# Patient Record
Sex: FEMALE | Race: WHITE | Employment: FULL TIME | URBAN - METROPOLITAN AREA
[De-identification: names, ages, dates, MRNs, and addresses within clinical notes are randomized per-mention and may not be internally consistent; named-entity substitution may affect disease eponyms.]

---

## 2017-03-22 ENCOUNTER — OFFICE VISIT (OUTPATIENT)
Dept: CARDIOLOGY CLINIC | Age: 57
End: 2017-03-22

## 2017-03-22 VITALS
HEIGHT: 66 IN | HEART RATE: 60 BPM | BODY MASS INDEX: 29.28 KG/M2 | SYSTOLIC BLOOD PRESSURE: 134 MMHG | DIASTOLIC BLOOD PRESSURE: 84 MMHG | WEIGHT: 182.2 LBS | OXYGEN SATURATION: 96 %

## 2017-03-22 DIAGNOSIS — I25.10 CORONARY ARTERY DISEASE INVOLVING NATIVE HEART WITHOUT ANGINA PECTORIS, UNSPECIFIED VESSEL OR LESION TYPE: Primary | ICD-10-CM

## 2017-03-22 DIAGNOSIS — D69.41 EVAN'S SYNDROME (HCC): ICD-10-CM

## 2017-03-22 PROCEDURE — 99213 OFFICE O/P EST LOW 20 MIN: CPT | Performed by: INTERNAL MEDICINE

## 2017-08-15 PROBLEM — I20.0 UNSTABLE ANGINA (HCC): Status: ACTIVE | Noted: 2017-08-15

## 2017-08-18 ENCOUNTER — TELEPHONE (OUTPATIENT)
Dept: CARDIOLOGY CLINIC | Age: 57
End: 2017-08-18

## 2017-08-23 ENCOUNTER — OFFICE VISIT (OUTPATIENT)
Dept: CARDIOLOGY CLINIC | Age: 57
End: 2017-08-23

## 2017-08-23 VITALS
OXYGEN SATURATION: 98 % | HEART RATE: 60 BPM | HEIGHT: 66 IN | BODY MASS INDEX: 28.61 KG/M2 | DIASTOLIC BLOOD PRESSURE: 72 MMHG | WEIGHT: 178 LBS | SYSTOLIC BLOOD PRESSURE: 142 MMHG

## 2017-08-23 DIAGNOSIS — I25.708 CORONARY ARTERY DISEASE OF BYPASS GRAFT OF NATIVE HEART WITH STABLE ANGINA PECTORIS (HCC): Primary | ICD-10-CM

## 2017-08-23 DIAGNOSIS — E78.2 MIXED HYPERLIPIDEMIA: ICD-10-CM

## 2017-08-23 PROCEDURE — 99213 OFFICE O/P EST LOW 20 MIN: CPT | Performed by: NURSE PRACTITIONER

## 2017-08-23 RX ORDER — AMLODIPINE BESYLATE 5 MG/1
5 TABLET ORAL DAILY
Qty: 30 TABLET | Refills: 3 | Status: SHIPPED | OUTPATIENT
Start: 2017-08-23 | End: 2017-12-06 | Stop reason: SDUPTHER

## 2017-08-25 ENCOUNTER — TELEPHONE (OUTPATIENT)
Dept: CARDIOLOGY CLINIC | Age: 57
End: 2017-08-25

## 2017-09-29 ENCOUNTER — OFFICE VISIT (OUTPATIENT)
Dept: CARDIOLOGY CLINIC | Age: 57
End: 2017-09-29

## 2017-09-29 VITALS
DIASTOLIC BLOOD PRESSURE: 72 MMHG | WEIGHT: 182.9 LBS | HEIGHT: 66 IN | BODY MASS INDEX: 29.39 KG/M2 | HEART RATE: 60 BPM | SYSTOLIC BLOOD PRESSURE: 112 MMHG

## 2017-09-29 DIAGNOSIS — E78.2 MIXED HYPERLIPIDEMIA: ICD-10-CM

## 2017-09-29 DIAGNOSIS — I25.708 CORONARY ARTERY DISEASE OF BYPASS GRAFT OF NATIVE HEART WITH STABLE ANGINA PECTORIS (HCC): Primary | ICD-10-CM

## 2017-09-29 PROCEDURE — 99213 OFFICE O/P EST LOW 20 MIN: CPT | Performed by: NURSE PRACTITIONER

## 2017-09-29 RX ORDER — RANOLAZINE 500 MG/1
500 TABLET, EXTENDED RELEASE ORAL 2 TIMES DAILY
Qty: 60 TABLET | Refills: 3 | Status: SHIPPED | OUTPATIENT
Start: 2017-09-29 | End: 2018-01-30 | Stop reason: SDUPTHER

## 2017-11-22 ENCOUNTER — OFFICE VISIT (OUTPATIENT)
Dept: CARDIOLOGY CLINIC | Age: 57
End: 2017-11-22

## 2017-11-22 VITALS
WEIGHT: 185 LBS | DIASTOLIC BLOOD PRESSURE: 80 MMHG | SYSTOLIC BLOOD PRESSURE: 142 MMHG | BODY MASS INDEX: 29.73 KG/M2 | HEIGHT: 66 IN | OXYGEN SATURATION: 97 % | HEART RATE: 61 BPM

## 2017-11-22 DIAGNOSIS — E78.2 MIXED HYPERLIPIDEMIA: ICD-10-CM

## 2017-11-22 DIAGNOSIS — I25.708 CORONARY ARTERY DISEASE OF BYPASS GRAFT OF NATIVE HEART WITH STABLE ANGINA PECTORIS (HCC): Primary | ICD-10-CM

## 2017-11-22 PROCEDURE — G8484 FLU IMMUNIZE NO ADMIN: HCPCS | Performed by: NURSE PRACTITIONER

## 2017-11-22 PROCEDURE — G8427 DOCREV CUR MEDS BY ELIG CLIN: HCPCS | Performed by: NURSE PRACTITIONER

## 2017-11-22 PROCEDURE — G8419 CALC BMI OUT NRM PARAM NOF/U: HCPCS | Performed by: NURSE PRACTITIONER

## 2017-11-22 PROCEDURE — 99213 OFFICE O/P EST LOW 20 MIN: CPT | Performed by: NURSE PRACTITIONER

## 2017-11-22 PROCEDURE — G8598 ASA/ANTIPLAT THER USED: HCPCS | Performed by: NURSE PRACTITIONER

## 2017-11-22 PROCEDURE — 1036F TOBACCO NON-USER: CPT | Performed by: NURSE PRACTITIONER

## 2017-11-22 PROCEDURE — 3017F COLORECTAL CA SCREEN DOC REV: CPT | Performed by: NURSE PRACTITIONER

## 2017-11-22 PROCEDURE — 3014F SCREEN MAMMO DOC REV: CPT | Performed by: NURSE PRACTITIONER

## 2017-11-22 RX ORDER — ROSUVASTATIN CALCIUM 40 MG/1
40 TABLET, COATED ORAL EVERY EVENING
Qty: 30 TABLET | Refills: 3 | Status: SHIPPED | OUTPATIENT
Start: 2017-11-22 | End: 2018-04-27 | Stop reason: SDUPTHER

## 2017-11-22 NOTE — PROGRESS NOTES
[address]     Outpatient Follow Up Note    Tia Caballero is 61 y.o. female who presents today with a history of CAD s/p CABG Jan '14, occl SVG > OM by cath and hyperlipidemia. CHIEF COMPLAINT / HPI:  Follow Up secondary to coronary artery disease: starting Ranexa    Subjective:   She's been feeling good. When she wakes, she has a little twinge in her left upper chest and knows its time to take her medications. It lasts about an hour  Her angina equivalent was diaphoresis, SOB climbing steps, a little nausea and shoulder numbness. She hasn't had a reoccurence. There is no SOB/WASHBURN. The patient denies orthopnea/PND. The patient does not have swelling. The patients weight is stable . The patient is not experiencing palpitations or dizziness. These symptoms are improving since the last OV. With regard to medication therapy the patient has been compliant with prescribed regimen. They have tolerated therapy to date. 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    Hyperlipidemia     Hypertension      Social History:    History   Smoking Status    Former Smoker    Packs/day: 2.00    Years: 20.00    Start date: 1/1/1977   [facility] Quit date: 2/1/1996   Smokeless Tobacco    Never Used     Current Medications:  Current Outpatient Prescriptions   Medication Sig Dispense Refill    metoprolol tartrate (LOPRESSOR) 25 MG tablet TAKE 1 TABLET BY MOUTH TWO TIMES A DAY  60 tablet 4    ranolazine (RANEXA) 500 MG extended release tablet Take 1 tablet by mouth 2 times daily 60 tablet 3    amLODIPine (NORVASC) 5 MG tablet Take 1 tablet by mouth daily 30 tablet 3    aspirin 325 MG EC tablet Take 1 tablet by mouth daily 30 tablet 3    nitroGLYCERIN (NITROSTAT) 0.4 MG SL tablet Place 1 tablet under the tongue every 5 minutes as needed for Chest pain up to max of 3 total doses.  If no relief after 1 dose, call 408. 51 tablet 3    folic acid (FOLVITE) 1 MG tablet Take 1 mg by mouth daily      lisinopril (PRINIVIL;ZESTRIL) 10 MG tablet Take 10 mg by mouth daily.  atorvastatin (LIPITOR) 40 MG tablet TAKE 1 TABLET BY MOUTH AT BEDTIME  30 tablet 4     No current facility-administered medications for this visit. REVIEW OF SYSTEMS:    CONSTITUTIONAL: No major weight gain or loss, fatigue, weakness, night sweats or fever. HEENT: No new vision difficulties or ringing in the ears. RESPIRATORY: No new SOB, PND, orthopnea or cough. CARDIOVASCULAR: See HPI  GI: No nausea, vomiting, diarrhea, constipation, abdominal pain or changes in bowel habits. : No urinary frequency, urgency, incontinence hematuria or dysuria. SKIN: No cyanosis or skin lesions. MUSCULOSKELETAL: No new muscle or joint pain. NEUROLOGICAL: No syncope or TIA-like symptoms. PSYCHIATRIC: No anxiety, pain, insomnia or depression    Objective:   PHYSICAL EXAM:        Vitals:    11/22/17 1148 11/22/17 1216   BP: 132/84 (!) 142/80   Site: Left Arm    Position: Sitting    Cuff Size: Medium Adult    Pulse: 61    SpO2: 97%    Weight: 185 lb (83.9 kg)    Height: 5' 6\" (1.676 m)        VITALS:  /84 (Site: Left Arm, Position: Sitting, Cuff Size: Medium Adult)   Pulse 61   Ht 5' 6\" (1.676 m)   Wt 185 lb (83.9 kg)   SpO2 97%   BMI 29.86 kg/m²   CONSTITUTIONAL: Cooperative, no apparent distress, and appears well nourished / developed  NEUROLOGIC:  Awake and orientated to person, place and time. PSYCH: Calm affect. SKIN: Warm and dry. HEENT: Sclera non-icteric, normocephalic, neck supple, no elevation of JVP, normal carotid pulses with no bruits and thyroid normal size. LUNGS:  No increased work of breathing and clear to auscultation, no crackles or wheezing  CARDIOVASCULAR:  Regular rate 64 and rhythm with no murmurs, gallops, rubs, or abnormal heart sounds, normal PMI. The apical impulses not displaced  JVP less than 8 cm H2O  Heart tones are crisp and

## 2017-11-22 NOTE — COMMUNICATION BODY
[address]     Outpatient Follow Up Note    Eli Ruvalcaba is 61 y.o. female who presents today with a history of CAD s/p CABG Jan '14, occl SVG > OM by cath and hyperlipidemia. CHIEF COMPLAINT / HPI:  Follow Up secondary to coronary artery disease: starting Ranexa    Subjective:   She's been feeling good. When she wakes, she has a little twinge in her left upper chest and knows its time to take her medications. It lasts about an hour  Her angina equivalent was diaphoresis, SOB climbing steps, a little nausea and shoulder numbness. She hasn't had a reoccurence. There is no SOB/WASHBURN. The patient denies orthopnea/PND. The patient does not have swelling. The patients weight is stable . The patient is not experiencing palpitations or dizziness. These symptoms are improving since the last OV. With regard to medication therapy the patient has been compliant with prescribed regimen. They have tolerated therapy to date. Current Outpatient Prescriptions   Medication Sig Dispense Refill    metoprolol tartrate (LOPRESSOR) 25 MG tablet TAKE 1 TABLET BY MOUTH TWO TIMES A DAY  60 tablet 4    ranolazine (RANEXA) 500 MG extended release tablet Take 1 tablet by mouth 2 times daily 60 tablet 3    amLODIPine (NORVASC) 5 MG tablet Take 1 tablet by mouth daily 30 tablet 3    aspirin 325 MG EC tablet Take 1 tablet by mouth daily 30 tablet 3    nitroGLYCERIN (NITROSTAT) 0.4 MG SL tablet Place 1 tablet under the tongue every 5 minutes as needed for Chest pain up to max of 3 total doses. If no relief after 1 dose, call 911. 25 tablet 3    folic acid (FOLVITE) 1 MG tablet Take 1 mg by mouth daily      lisinopril (PRINIVIL;ZESTRIL) 10 MG tablet Take 10 mg by mouth daily.       atorvastatin (LIPITOR) 40 MG tablet TAKE 1 TABLET BY MOUTH AT BEDTIME  30 tablet 4         Objective:   PHYSICAL EXAM:        Vitals:    11/22/17 1148 11/22/17 1216   BP: 132/84 (!) 142/80   Site: Left Arm    Position: Sitting    Cuff current supply of atorvastatin is finished, she will change to creator 40 mg daily   ~recheck profile/LFTs 8 weeks after med change  2. F/U in 4 months     Overall the patient is stable from CV standpoint    I have addresed the patient's cardiac risk factors and adjusted pharmacologic treatment as needed. In addition, I have reinforced the need for patient directed risk factor modification. Further evaluation will be based upon the patient's clinical course and testing results. All questions and concerns were addressed to the patient. Alternatives to my treatment were discussed. The patient is not currently smoking. The risks related to smoking were reviewed with the patient. Recommend maintaining a smoke-free lifestyle. Patient is on a beta-blocker  Patient is on an ace-i  Patient is on a statin    Antiplatelet therapy has been recommended / prescribed for this patient. Education conducted on adverse reactions including bleeding was discussed. The patient verbalizes understanding not to stop medications without discussing with us. Discussed exercise: 30-60 minutes 7 days/week  Discussed Low saturated fat diet. Thank you for allowing to us to participate in the care of Shelby Oakley.

## 2017-11-22 NOTE — LETTER
 folic acid (FOLVITE) 1 MG tablet Take 1 mg by mouth daily      lisinopril (PRINIVIL;ZESTRIL) 10 MG tablet Take 10 mg by mouth daily.  atorvastatin (LIPITOR) 40 MG tablet TAKE 1 TABLET BY MOUTH AT BEDTIME  30 tablet 4         Objective:   PHYSICAL EXAM:        Vitals:    11/22/17 1148 11/22/17 1216   BP: 132/84 (!) 142/80   Site: Left Arm    Position: Sitting    Cuff Size: Medium Adult    Pulse: 61    SpO2: 97%    Weight: 185 lb (83.9 kg)    Height: 5' 6\" (1.676 m)        VITALS:  /84 (Site: Left Arm, Position: Sitting, Cuff Size: Medium Adult)   Pulse 61   Ht 5' 6\" (1.676 m)   Wt 185 lb (83.9 kg)   SpO2 97%   BMI 29.86 kg/m²    CONSTITUTIONAL: Cooperative, no apparent distress, and appears well nourished / developed  NEUROLOGIC:  Awake and orientated to person, place and time. PSYCH: Calm affect. SKIN: Warm and dry. HEENT: Sclera non-icteric, normocephalic, neck supple, no elevation of JVP, normal carotid pulses with no bruits and thyroid normal size. LUNGS:  No increased work of breathing and clear to auscultation, no crackles or wheezing  CARDIOVASCULAR:  Regular rate 64 and rhythm with no murmurs, gallops, rubs, or abnormal heart sounds, normal PMI. The apical impulses not displaced  JVP less than 8 cm H2O  Heart tones are crisp and normal  Cervical veins are not engorged  The carotid upstroke is normal in amplitude and contour without delay or bruit  JVP is not elevated  ABDOMEN:  Normal bowel sounds, non-distended and non-tender to palpation  EXT: No edema, no calf tenderness. Pulses are present bilaterally. DATA:    Lab Results   Component Value Date    TRIG 165 (H) 08/16/2017     Lab Results   Component Value Date    HDL 53 08/16/2017     Lab Results   Component Value Date    LDLCALC 56 08/16/2017     Lab Results   Component Value Date    LABVLDL 33 08/16/2017       Assessment:     1.  Coronary artery disease of bypass graft of native heart with stable angina pectoris ([facility] [address] ~improved and overall feeling good on Ranexa at 500 mg bid  ~occl SVG > OM ; mild competitive flow LAD  ~intolerant to Imdur with HAs   ~ASA / statin / BB    2. Mixed hyperlipidemia   ~unchanged / stable   ~trig near goal otherwise well controlled   ~atorvastatin           I had the opportunity to review the clinical symptoms and presentation of Pearl Loyola. Plan:     1. Once current supply of atorvastatin is finished, she will change to creator 40 mg daily   ~recheck profile/LFTs 8 weeks after med change  2. F/U in 4 months     Overall the patient is stable from CV standpoint    I have addresed the patient's cardiac risk factors and adjusted pharmacologic treatment as needed. In addition, I have reinforced the need for patient directed risk factor modification. Further evaluation will be based upon the patient's clinical course and testing results. All questions and concerns were addressed to the patient. Alternatives to my treatment were discussed. The patient is not currently smoking. The risks related to smoking were reviewed with the patient. Recommend maintaining a smoke-free lifestyle. Patient is on a beta-blocker  Patient is on an ace-i  Patient is on a statin    Antiplatelet therapy has been recommended / prescribed for this patient. Education conducted on adverse reactions including bleeding was discussed. The patient verbalizes understanding not to stop medications without discussing with us. Discussed exercise: 30-60 minutes 7 days/week  Discussed Low saturated fat diet. Thank you for allowing to us to participate in the care of Pearl Loyola. If you have questions, please do not hesitate to call me. I look forward to following Piter Reid along with you.     Sincerely,        Awa Olmstead NP

## 2017-12-06 RX ORDER — AMLODIPINE BESYLATE 5 MG/1
TABLET ORAL
Qty: 30 TABLET | Refills: 2 | Status: SHIPPED | OUTPATIENT
Start: 2017-12-06 | End: 2017-12-07 | Stop reason: SDUPTHER

## 2017-12-07 RX ORDER — AMLODIPINE BESYLATE 5 MG/1
TABLET ORAL
Qty: 30 TABLET | Refills: 5 | Status: SHIPPED | OUTPATIENT
Start: 2017-12-07 | End: 2018-04-03 | Stop reason: DRUGHIGH

## 2018-01-31 ENCOUNTER — TELEPHONE (OUTPATIENT)
Dept: CARDIOLOGY CLINIC | Age: 58
End: 2018-01-31

## 2018-01-31 RX ORDER — RANOLAZINE 500 MG/1
TABLET, FILM COATED, EXTENDED RELEASE ORAL
Qty: 60 TABLET | Refills: 2 | Status: SHIPPED | OUTPATIENT
Start: 2018-01-31 | End: 2018-03-23

## 2018-01-31 NOTE — TELEPHONE ENCOUNTER
Pt states that Ranexa is too costly , she said that she took something previously helped but could not remember the name, and could not give to me because she wasn't at home. I asked if it was the Imdur and she stated that she didn't think that was the name of it. I told her I didn't see any other med the would fall along those lines. I told her that I could send a message to [facility], but not here till tomorrow. She said she was ok with that, and that she has already been out for 3 days.

## 2018-02-02 NOTE — TELEPHONE ENCOUNTER
Can you see if she's eligible for assistance through the company.  She can't take Imdur and is already on amlodipine

## 2018-02-05 NOTE — TELEPHONE ENCOUNTER
Spoke with pt- referred pt.to [facility] [phone]. On line sts that they will cover what ins does not pay. She will call them now. Advised to let them know she can't take Imdur and she is already on amlodipine.

## 2018-03-21 NOTE — PROGRESS NOTES
[address]       H+P // CONSULT // OUTPATIENT VISIT // [facility]     Referring Doctor Reese Burn [] Acute [x] Chronic     Symptom [x] None [] CP [] SOB [] Dizzy [] Palps [] Fatigue     Problems CAD s/p CABG, HTN, CHOL      HISTORY OF PRESENT ILLNESS     Doing well. Denies cp, sob. Compliant with meds. Rare cp. Not taking ranexa due to cost.       Symptom Y N Frequency Duration Severity Modifying Assoc Sx Other   CP [x] [] rare secs mild none none    SOB [] [x]         Dizzy [] [x]         Syncope [] [x]         Palpitations [] [x]           COMPLIANCE     Category Meds Diet Salt Exercise Tobacco Alcohol Drugs   Compliant [x] [x] [x] [x] [x] [x] [x]   [x]Counseling given on all above above categories    HISTORY/ALLERGIES/ROS     MedHx:  has a past medical history of CAD (coronary artery disease); Disease of blood and blood forming organ; Hyperlipidemia; and Hypertension. SurgHx:  has a past surgical history that includes Appendectomy; Knee arthroscopy (Left, ); Carpal tunnel release (Bilateral, );  section; Tonsillectomy; Coronary artery bypass graft (14 emergent); Splenectomy (12/15); Cardiac surgery; and Coronary angioplasty (2017). SocHx:   reports that she quit smoking about 22 years ago. She started smoking about 41 years ago. She has a 40.00 pack-year smoking history. She has never used smokeless tobacco. She reports that she drinks alcohol. She reports that she does not use drugs. FamHx: family history includes High Blood Pressure in her mother. Allergies: Eggs or egg-derived products [eggs or egg-derived products];  Imdur [isosorbide dinitrate]; and Poultry meal   ROS:  [x]Full ROS obtained and negative except as mentioned in HPI     MEDICATIONS      Current Outpatient Prescriptions   Medication Sig Dispense Refill    metoprolol tartrate (LOPRESSOR) 25 MG tablet TAKE 1 TABLET BY MOUTH TWO TIMES A DAY  60 tablet 5    metoprolol tartrate (LOPRESSOR) 25 MG tablet TAKE 1 TABLET BY MOUTH TWO TIMES A DAY  60 tablet 4    RANEXA 500 MG extended release tablet TAKE 1 TABLET BY MOUTH TWO TIMES A DAY  60 tablet 2    amLODIPine (NORVASC) 5 MG tablet TAKE 1 TABLET BY MOUTH ONE TIME A DAY 30 tablet 5    rosuvastatin (CRESTOR) 40 MG tablet Take 1 tablet by mouth every evening 30 tablet 3    aspirin 325 MG EC tablet Take 1 tablet by mouth daily 30 tablet 3    nitroGLYCERIN (NITROSTAT) 0.4 MG SL tablet Place 1 tablet under the tongue every 5 minutes as needed for Chest pain up to max of 3 total doses. If no relief after 1 dose, call 911. 25 tablet 3    folic acid (FOLVITE) 1 MG tablet Take 1 mg by mouth daily      lisinopril (PRINIVIL;ZESTRIL) 10 MG tablet Take 10 mg by mouth daily.  atorvastatin (LIPITOR) 40 MG tablet TAKE 1 TABLET BY MOUTH AT BEDTIME  30 tablet 4     No current facility-administered medications for this visit.       Reviewed with patient and will remain unchanged except as mentioned in A/P  PHYSICAL EXAM     Vitals:    03/23/18 0745   BP: 112/70   Pulse: 59   SpO2: 95%      Gen Alert, coop, no distress Heart  RRR, no MRG, nl apic impulse   Head NC, AT, no abnorm Abd  Soft, NT, +BS, no mass, no OM   Eyes PERRLA, conj/corn clear Ext  Ext nl, AT, no C/C/E   Nose Nares nl, no drain, NT Pulse 2+ and symmetric   Throat Lips, mucosa, tongue nl Skin Color/text/turg nl, no rash/lesions   Neck S/S, TM, NT, no bruit/JVD Psych Nl mood and affect   Lung CTA-B, unlabored, no DTP Lymph   No cervical or axillary LA   Ch wall NT, no deform Neuro  Nl gross M/S exam     ASSESSMENT AND PLAN   ~CAD   Date EF Results   Sx   No concerning   [facility]   [x]I         []II           []III          []IV   Hx 1/14  CABG   [facility] 1/14 8/17   55% MVD  SVG-OM occluded - medical management   MPI 10/15 61% Lateral ischemia/scar   TTE   None   Plan   Continue aggressive medical treatment at doses above   ~HTN  Today BP [x] Controlled

## 2018-03-21 NOTE — TELEPHONE ENCOUNTER
Last ov 3/22/17  Pending appt 3/23/18  Last refill 3/7/18 #60x4, transmission did not go through [facility] meds

## 2018-03-23 ENCOUNTER — OFFICE VISIT (OUTPATIENT)
Dept: CARDIOLOGY CLINIC | Age: 58
End: 2018-03-23

## 2018-03-23 VITALS
HEIGHT: 66 IN | BODY MASS INDEX: 30.36 KG/M2 | SYSTOLIC BLOOD PRESSURE: 112 MMHG | DIASTOLIC BLOOD PRESSURE: 70 MMHG | OXYGEN SATURATION: 95 % | HEART RATE: 59 BPM | WEIGHT: 188.9 LBS

## 2018-03-23 DIAGNOSIS — E78.00 HYPERCHOLESTEREMIA: ICD-10-CM

## 2018-03-23 DIAGNOSIS — I10 ESSENTIAL HYPERTENSION: ICD-10-CM

## 2018-03-23 DIAGNOSIS — I25.10 CORONARY ARTERY DISEASE INVOLVING NATIVE CORONARY ARTERY OF NATIVE HEART WITHOUT ANGINA PECTORIS: Primary | ICD-10-CM

## 2018-03-23 PROCEDURE — G8598 ASA/ANTIPLAT THER USED: HCPCS | Performed by: INTERNAL MEDICINE

## 2018-03-23 PROCEDURE — G8484 FLU IMMUNIZE NO ADMIN: HCPCS | Performed by: INTERNAL MEDICINE

## 2018-03-23 PROCEDURE — 1036F TOBACCO NON-USER: CPT | Performed by: INTERNAL MEDICINE

## 2018-03-23 PROCEDURE — G8427 DOCREV CUR MEDS BY ELIG CLIN: HCPCS | Performed by: INTERNAL MEDICINE

## 2018-03-23 PROCEDURE — G8417 CALC BMI ABV UP PARAM F/U: HCPCS | Performed by: INTERNAL MEDICINE

## 2018-03-23 PROCEDURE — 99214 OFFICE O/P EST MOD 30 MIN: CPT | Performed by: INTERNAL MEDICINE

## 2018-03-23 PROCEDURE — 3017F COLORECTAL CA SCREEN DOC REV: CPT | Performed by: INTERNAL MEDICINE

## 2018-03-23 PROCEDURE — 3014F SCREEN MAMMO DOC REV: CPT | Performed by: INTERNAL MEDICINE

## 2018-03-23 RX ORDER — ASPIRIN 81 MG/1
81 TABLET ORAL DAILY
Qty: 30 TABLET | Refills: 0 | Status: SHIPPED | OUTPATIENT
Start: 2018-03-23 | End: 2018-04-19 | Stop reason: SDUPTHER

## 2018-03-23 NOTE — COMMUNICATION BODY
A DAY  60 tablet 5    metoprolol tartrate (LOPRESSOR) 25 MG tablet TAKE 1 TABLET BY MOUTH TWO TIMES A DAY  60 tablet 4    RANEXA 500 MG extended release tablet TAKE 1 TABLET BY MOUTH TWO TIMES A DAY  60 tablet 2    amLODIPine (NORVASC) 5 MG tablet TAKE 1 TABLET BY MOUTH ONE TIME A DAY 30 tablet 5    rosuvastatin (CRESTOR) 40 MG tablet Take 1 tablet by mouth every evening 30 tablet 3    aspirin 325 MG EC tablet Take 1 tablet by mouth daily 30 tablet 3    nitroGLYCERIN (NITROSTAT) 0.4 MG SL tablet Place 1 tablet under the tongue every 5 minutes as needed for Chest pain up to max of 3 total doses. If no relief after 1 dose, call 911. 25 tablet 3    folic acid (FOLVITE) 1 MG tablet Take 1 mg by mouth daily      lisinopril (PRINIVIL;ZESTRIL) 10 MG tablet Take 10 mg by mouth daily.  atorvastatin (LIPITOR) 40 MG tablet TAKE 1 TABLET BY MOUTH AT BEDTIME  30 tablet 4     No current facility-administered medications for this visit.       Reviewed with patient and will remain unchanged except as mentioned in A/P  PHYSICAL EXAM     Vitals:    03/23/18 0745   BP: 112/70   Pulse: 59   SpO2: 95%      Gen Alert, coop, no distress Heart  RRR, no MRG, nl apic impulse   Head NC, AT, no abnorm Abd  Soft, NT, +BS, no mass, no OM   Eyes PERRLA, conj/corn clear Ext  Ext nl, AT, no C/C/E   Nose Nares nl, no drain, NT Pulse 2+ and symmetric   Throat Lips, mucosa, tongue nl Skin Color/text/turg nl, no rash/lesions   Neck S/S, TM, NT, no bruit/JVD Psych Nl mood and affect   Lung CTA-B, unlabored, no DTP Lymph   No cervical or axillary LA   Ch wall NT, no deform Neuro  Nl gross M/S exam     ASSESSMENT AND PLAN   ~CAD   Date EF Results   Sx   No concerning   [facility]   [x]I         []II           []III          []IV   Hx 1/14  CABG   [facility] 1/14 8/17   55% MVD  SVG-OM occluded - medical management   MPI 10/15 61% Lateral ischemia/scar   TTE   None   Plan   Continue aggressive medical treatment at doses above   ~HTN  Today BP [x] Controlled [] Borderline [] Uncontrolled   Counseling [x] Diet/Salt [x] Exercise [x] Weight    Plan Continue current medications at doses detailed above  ~Hyperchol  Goal LDL [] <100 [x] <70     Counseling [x] Diet [x] Weight [x] Exercise   Lipid/liver Monitor [x] PCP [] Cardio [] Endocrine   Plan Continue current doses of meds listed above  ~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 Cristóbal Cummings, Hematologist  ~Followup  []2 wk   []1m   []3m    [x]6m   []12m    []PRN  []Other:

## 2018-03-30 ENCOUNTER — HOSPITAL ENCOUNTER (OUTPATIENT)
Dept: VASCULAR LAB | Age: 58
Discharge: HOME | End: 2018-03-30
Attending: EMERGENCY MEDICINE | Admitting: EMERGENCY MEDICINE

## 2018-03-30 DIAGNOSIS — R60.0 LOCALIZED EDEMA: ICD-10-CM

## 2018-03-30 DIAGNOSIS — R60.0 BILATERAL LOWER EXTREMITY EDEMA: ICD-10-CM

## 2018-04-03 ENCOUNTER — OFFICE VISIT (OUTPATIENT)
Dept: CARDIOLOGY CLINIC | Age: 58
End: 2018-04-03

## 2018-04-03 VITALS
DIASTOLIC BLOOD PRESSURE: 72 MMHG | OXYGEN SATURATION: 96 % | BODY MASS INDEX: 30.53 KG/M2 | HEART RATE: 68 BPM | HEIGHT: 66 IN | SYSTOLIC BLOOD PRESSURE: 128 MMHG | WEIGHT: 190 LBS

## 2018-04-03 DIAGNOSIS — I10 ESSENTIAL HYPERTENSION: ICD-10-CM

## 2018-04-03 DIAGNOSIS — E78.2 MIXED HYPERLIPIDEMIA: ICD-10-CM

## 2018-04-03 DIAGNOSIS — R60.0 LOCALIZED EDEMA: Primary | ICD-10-CM

## 2018-04-03 DIAGNOSIS — I25.10 CORONARY ARTERY DISEASE INVOLVING NATIVE CORONARY ARTERY OF NATIVE HEART WITHOUT ANGINA PECTORIS: ICD-10-CM

## 2018-04-03 PROCEDURE — G8598 ASA/ANTIPLAT THER USED: HCPCS | Performed by: NURSE PRACTITIONER

## 2018-04-03 PROCEDURE — G8417 CALC BMI ABV UP PARAM F/U: HCPCS | Performed by: NURSE PRACTITIONER

## 2018-04-03 PROCEDURE — 1036F TOBACCO NON-USER: CPT | Performed by: NURSE PRACTITIONER

## 2018-04-03 PROCEDURE — G8427 DOCREV CUR MEDS BY ELIG CLIN: HCPCS | Performed by: NURSE PRACTITIONER

## 2018-04-03 PROCEDURE — 99214 OFFICE O/P EST MOD 30 MIN: CPT | Performed by: NURSE PRACTITIONER

## 2018-04-03 PROCEDURE — 3017F COLORECTAL CA SCREEN DOC REV: CPT | Performed by: NURSE PRACTITIONER

## 2018-04-03 PROCEDURE — 3014F SCREEN MAMMO DOC REV: CPT | Performed by: NURSE PRACTITIONER

## 2018-04-03 RX ORDER — AMLODIPINE BESYLATE 5 MG/1
TABLET ORAL
Qty: 30 TABLET | Refills: 5 | Status: SHIPPED
Start: 2018-04-03 | End: 2018-05-03

## 2018-04-03 RX ORDER — LISINOPRIL 20 MG/1
20 TABLET ORAL DAILY
Qty: 90 TABLET | Refills: 3 | Status: SHIPPED | OUTPATIENT
Start: 2018-04-03 | End: 2018-05-03 | Stop reason: SDUPTHER

## 2018-04-19 RX ORDER — ASPIRIN 81 MG/1
TABLET, DELAYED RELEASE ORAL
Qty: 30 TABLET | Refills: 0 | Status: SHIPPED | OUTPATIENT
Start: 2018-04-19 | End: 2019-12-09

## 2018-04-27 RX ORDER — ROSUVASTATIN CALCIUM 40 MG/1
TABLET, COATED ORAL
Qty: 30 TABLET | Refills: 1 | Status: SHIPPED | OUTPATIENT
Start: 2018-04-27 | End: 2018-06-30 | Stop reason: SDUPTHER

## 2018-05-03 ENCOUNTER — HOSPITAL ENCOUNTER (OUTPATIENT)
Dept: NON INVASIVE DIAGNOSTICS | Age: 58
Discharge: HOME | End: 2018-05-03
Attending: NURSE PRACTITIONER | Admitting: NURSE PRACTITIONER

## 2018-05-03 ENCOUNTER — OFFICE VISIT (OUTPATIENT)
Dept: CARDIOLOGY CLINIC | Age: 58
End: 2018-05-03

## 2018-05-03 VITALS
DIASTOLIC BLOOD PRESSURE: 72 MMHG | HEART RATE: 60 BPM | BODY MASS INDEX: 30.7 KG/M2 | SYSTOLIC BLOOD PRESSURE: 134 MMHG | WEIGHT: 191 LBS | HEIGHT: 66 IN

## 2018-05-03 DIAGNOSIS — I25.10 CORONARY ARTERY DISEASE INVOLVING NATIVE CORONARY ARTERY OF NATIVE HEART WITHOUT ANGINA PECTORIS: ICD-10-CM

## 2018-05-03 DIAGNOSIS — R60.0 LOCALIZED EDEMA: Primary | ICD-10-CM

## 2018-05-03 DIAGNOSIS — I10 ESSENTIAL HYPERTENSION: ICD-10-CM

## 2018-05-03 DIAGNOSIS — I25.10 ATHEROSCLEROTIC HEART DISEASE OF NATIVE CORONARY ARTERY WITHOUT ANGINA PECTORIS: ICD-10-CM

## 2018-05-03 LAB
LV EF: 55 %
LVEF MODALITY: NORMAL

## 2018-05-03 PROCEDURE — 3017F COLORECTAL CA SCREEN DOC REV: CPT | Performed by: NURSE PRACTITIONER

## 2018-05-03 PROCEDURE — G8417 CALC BMI ABV UP PARAM F/U: HCPCS | Performed by: NURSE PRACTITIONER

## 2018-05-03 PROCEDURE — G8598 ASA/ANTIPLAT THER USED: HCPCS | Performed by: NURSE PRACTITIONER

## 2018-05-03 PROCEDURE — 1036F TOBACCO NON-USER: CPT | Performed by: NURSE PRACTITIONER

## 2018-05-03 PROCEDURE — 99214 OFFICE O/P EST MOD 30 MIN: CPT | Performed by: NURSE PRACTITIONER

## 2018-05-03 PROCEDURE — G8427 DOCREV CUR MEDS BY ELIG CLIN: HCPCS | Performed by: NURSE PRACTITIONER

## 2018-05-03 RX ORDER — LISINOPRIL 40 MG/1
40 TABLET ORAL DAILY
Qty: 90 TABLET | Refills: 3 | Status: SHIPPED | OUTPATIENT
Start: 2018-05-03 | End: 2019-05-23 | Stop reason: SDUPTHER

## 2018-06-18 ENCOUNTER — TELEPHONE (OUTPATIENT)
Dept: CARDIOLOGY CLINIC | Age: 58
End: 2018-06-18

## 2018-06-18 RX ORDER — HYDROCHLOROTHIAZIDE 25 MG/1
25 TABLET ORAL DAILY
Qty: 90 TABLET | Refills: 3 | Status: SHIPPED | OUTPATIENT
Start: 2018-06-18 | End: 2019-07-05 | Stop reason: SDUPTHER

## 2018-07-02 RX ORDER — ROSUVASTATIN CALCIUM 40 MG/1
TABLET, COATED ORAL
Qty: 90 TABLET | Refills: 3 | Status: SHIPPED | OUTPATIENT
Start: 2018-07-02 | End: 2018-09-17 | Stop reason: SDUPTHER

## 2018-09-14 NOTE — PATIENT INSTRUCTIONS
Decrease Crestor to 20 mg a night. You can cut current prescription in half. Continue all other medications. Get labs drawn when able to.

## 2018-09-14 NOTE — PROGRESS NOTES
[address]       H+P // CONSULT // OUTPATIENT VISIT // FOLLOWUP VISIT     Referring Doctor Lisbeth mSith [] Acute [x] Chronic     Symptom [x] None [] CP [] SOB [] Dizzy [] Palps [] Fatigue     Problems CAD s/p CABG, HTN, CHOL,       HISTORY OF PRESENT ILLNESS     Doing well. Denies cp, sob. Compliant with meds. Reports tingling in toes for last 2 months, constant, nonexertional.       Symptom Y N Frequency Duration Severity Modify Assoc Sx Other   CP [] [x]         SOB [] [x]         Dizzy [] [x]         Syncope [] [x]         Palpitations [] [x]           COMPLIANCE     Category Meds Diet Salt Exercise Tobacco Alcohol Drugs   Compliant [x] [x] [x] [x] [x] [x] [x]   [x]Counseling given on all above above categories    HISTORY/ALLERGIES/ROS     MedHx:  has a past medical history of CAD (coronary artery disease); Disease of blood and blood forming organ; Hyperlipidemia; and Hypertension. SurgHx:  has a past surgical history that includes Appendectomy; Knee arthroscopy (Left, ); Carpal tunnel release (Bilateral, );  section; Tonsillectomy; Coronary artery bypass graft (14 emergent); Splenectomy (12/15); Cardiac surgery; and Coronary angioplasty (2017). SocHx:   reports that she quit smoking about 22 years ago. She started smoking about 41 years ago. She has a 40.00 pack-year smoking history. She has never used smokeless tobacco. She reports that she drinks alcohol. She reports that she does not use drugs. FamHx: family history includes High Blood Pressure in her mother. Allergies: Eggs or egg-derived products [eggs or egg-derived products];  Imdur [isosorbide dinitrate]; and Poultry meal   ROS:  [x]Full ROS obtained and negative except as mentioned in HPI     MEDICATIONS      Current Outpatient Prescriptions   Medication Sig Dispense Refill    metoprolol tartrate (LOPRESSOR) 25 MG tablet TAKE 1 TABLET BY MOUTH crestor to 20mg  9/17 HDL:57, LDL:93  ~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 Daiana Cedillo, Hematologist  ~Peripheral tingling  Check BMP, MG  Reduced crestor to 20mg as correlates with titration  ~Followup  Interval: 6 months  Tests/Labs: BMP, Mg    Scribe Attestation  I, Julee Boys, am scribing for and in the presence of Jarad Aponte MD.   Signed, Julee Boys 09/14/18 10:41 AM   Provider Wes Rico is working as a scribe for and in the presence of me (Jarad Aponte MD). Working as a scribe, Julee Marga may have prepopulated components of this note with my historical  intellectual property under my direct supervision. Any additions to this intellectual property were performed in my presence and at my direction. Furthermore, the content and accuracy of this note have been reviewed by me Jarad Aponte MD).

## 2018-09-17 ENCOUNTER — HOSPITAL ENCOUNTER (OUTPATIENT)
Dept: OTHER | Age: 58
Discharge: HOME | End: 2018-09-17
Attending: INTERNAL MEDICINE | Admitting: INTERNAL MEDICINE

## 2018-09-17 ENCOUNTER — OFFICE VISIT (OUTPATIENT)
Dept: CARDIOLOGY CLINIC | Age: 58
End: 2018-09-17

## 2018-09-17 VITALS
HEIGHT: 66 IN | BODY MASS INDEX: 30.22 KG/M2 | WEIGHT: 188 LBS | HEART RATE: 80 BPM | SYSTOLIC BLOOD PRESSURE: 136 MMHG | DIASTOLIC BLOOD PRESSURE: 80 MMHG

## 2018-09-17 DIAGNOSIS — I10 ESSENTIAL HYPERTENSION: ICD-10-CM

## 2018-09-17 DIAGNOSIS — I25.10 CORONARY ARTERY DISEASE INVOLVING NATIVE CORONARY ARTERY OF NATIVE HEART WITHOUT ANGINA PECTORIS: Primary | ICD-10-CM

## 2018-09-17 DIAGNOSIS — E78.00 HYPERCHOLESTEREMIA: ICD-10-CM

## 2018-09-17 LAB
ALBUMIN SERPL-MCNC: 4.5 G/DL (ref 3.4–5)
ANION GAP SERPL CALCULATED.3IONS-SCNC: 16 MMOL/L (ref 3–16)
BUN BLDV-MCNC: 17 MG/DL (ref 7–20)
CALCIUM SERPL-MCNC: 10 MG/DL (ref 8.3–10.6)
CHLORIDE BLD-SCNC: 100 MMOL/L (ref 99–110)
CO2: 26 MMOL/L (ref 21–32)
CREAT SERPL-MCNC: 0.6 MG/DL (ref 0.6–1.1)
GFR AFRICAN AMERICAN: >60
GFR NON-AFRICAN AMERICAN: >60
GLUCOSE BLD-MCNC: 96 MG/DL (ref 70–99)
MAGNESIUM: 2.1 MG/DL (ref 1.8–2.4)
PHOSPHORUS: 4.2 MG/DL (ref 2.5–4.9)
POTASSIUM SERPL-SCNC: 3.8 MMOL/L (ref 3.5–5.1)
SODIUM BLD-SCNC: 142 MMOL/L (ref 136–145)

## 2018-09-17 PROCEDURE — G8427 DOCREV CUR MEDS BY ELIG CLIN: HCPCS | Performed by: INTERNAL MEDICINE

## 2018-09-17 PROCEDURE — 99214 OFFICE O/P EST MOD 30 MIN: CPT | Performed by: INTERNAL MEDICINE

## 2018-09-17 PROCEDURE — 3017F COLORECTAL CA SCREEN DOC REV: CPT | Performed by: INTERNAL MEDICINE

## 2018-09-17 PROCEDURE — G8417 CALC BMI ABV UP PARAM F/U: HCPCS | Performed by: INTERNAL MEDICINE

## 2018-09-17 PROCEDURE — G8598 ASA/ANTIPLAT THER USED: HCPCS | Performed by: INTERNAL MEDICINE

## 2018-09-17 PROCEDURE — 1036F TOBACCO NON-USER: CPT | Performed by: INTERNAL MEDICINE

## 2018-09-17 RX ORDER — ROSUVASTATIN CALCIUM 20 MG/1
20 TABLET, COATED ORAL EVERY EVENING
Qty: 90 TABLET | Refills: 3 | Status: SHIPPED | OUTPATIENT
Start: 2018-09-17 | End: 2019-10-18 | Stop reason: SDUPTHER

## 2018-09-17 NOTE — LETTER
smoking about 41 years ago. She has a 40.00 pack-year smoking history. She has never used smokeless tobacco. She reports that she drinks alcohol. She reports that she does not use drugs. FamHx: family history includes High Blood Pressure in her mother. Allergies: Eggs or egg-derived products [eggs or egg-derived products]; Imdur [isosorbide dinitrate]; and Poultry meal   ROS:  [x]Full ROS obtained and negative except as mentioned in HPI     MEDICATIONS      Current Outpatient Prescriptions   Medication Sig Dispense Refill    metoprolol tartrate (LOPRESSOR) 25 MG tablet TAKE 1 TABLET BY MOUTH TWO TIMES A DAY  60 tablet 5    rosuvastatin (CRESTOR) 40 MG tablet TAKE 1 TABLET BY MOUTH IN THE EVENING  90 tablet 3    hydrochlorothiazide (HYDRODIURIL) 25 MG tablet Take 1 tablet by mouth daily 90 tablet 3    lisinopril (PRINIVIL;ZESTRIL) 40 MG tablet Take 1 tablet by mouth daily 90 tablet 3    SM ASPIRIN ADULT LOW STRENGTH 81 MG EC tablet TAKE 1 TABLET BY MOUTH ONE TIME A DAY  30 tablet 0    nitroGLYCERIN (NITROSTAT) 0.4 MG SL tablet Place 1 tablet under the tongue every 5 minutes as needed for Chest pain up to max of 3 total doses. If no relief after 1 dose, call 911. 25 tablet 3     No current facility-administered medications for this visit.       Reviewed with patient and will remain unchanged except as mentioned in A/P  PHYSICAL EXAM     Vitals:    09/17/18 0733   BP: 136/80   Pulse: 80      Gen Alert, coop, no distress Heart  RRR, no MRG, nl apical impulse   Head NC, AT, no abnorm Abd  Soft, NT, +BS, no mass, no OM   Eyes PER, conj/corn clear Ext  Ext nl, AT, no C/C/E   Nose Nares nl, no drain, NT Pulse 2+ and symmetric   Throat Lips, mucosa, tongue nl Skin Col/text/turg nl, no vis rash/les   Neck S/S, TM, NT, no bruit/JVD Psych Nl mood and affect   Lung CTA-B, unlabored, no DTP Lymph   No cervical or axillary LA   Ch wall NT, no deform Neuro  Nl gross M/S exam     ASSESSMENT AND PLAN     ~CAD

## 2018-11-05 RX ORDER — AMLODIPINE BESYLATE 5 MG/1
TABLET ORAL
Qty: 30 TABLET | Refills: 4 | Status: SHIPPED | OUTPATIENT
Start: 2018-11-05 | End: 2019-04-19 | Stop reason: ALTCHOICE

## 2019-04-15 NOTE — PROGRESS NOTES
No show. Following historical only. [address]       H+P // CONSULT // OUTPATIENT VISIT // Corbin Back     Referring Doctor Gilford Mallick [] Acute [x] Chronic     Symptom [x] None [] CP [] SOB [] Dizzy [] Palps [] Fatigue     Problems CAD s/p CABG, HTN, CHOL,       HISTORY OF PRESENT ILLNESS          Symptom Y N Frequency Duration Severity Modify Assoc Sx Other   CP [] [x]         SOB [] [x]         Dizzy [] [x]         Syncope [] [x]         Palpitations [] [x]           COMPLIANCE     Category Meds Diet Salt Exercise Tobacco Alcohol Drugs   Compliant [x] [x] [x] [x] [x] [x] [x]   [x]Counseling given on all above above categories    HISTORY/ALLERGIES/ROS     MedHx:  has a past medical history of CAD (coronary artery disease), Disease of blood and blood forming organ, Hyperlipidemia, and Hypertension. SurgHx:  has a past surgical history that includes Appendectomy; Knee arthroscopy (Left, ); Carpal tunnel release (Bilateral, );  section; Tonsillectomy; Coronary artery bypass graft (14 emergent); Splenectomy (12/15); Cardiac surgery; and Coronary angioplasty (2017). SocHx:   reports that she quit smoking about 23 years ago. She started smoking about 42 years ago. She has a 40.00 pack-year smoking history. She has never used smokeless tobacco. She reports that she drinks alcohol. She reports that she does not use drugs. FamHx: family history includes High Blood Pressure in her mother. Allergies: Eggs or egg-derived products [eggs or egg-derived products];  Imdur [isosorbide dinitrate]; and Poultry meal   ROS:  [x]Full ROS obtained and negative except as mentioned in HPI     MEDICATIONS      Current Outpatient Medications   Medication Sig Dispense Refill    amLODIPine (NORVASC) 5 MG tablet TAKE 1 TABLET BY MOUTH ONE TIME A DAY  30 tablet 4    rosuvastatin (CRESTOR) 20 MG tablet Take 1 tablet by mouth every evening 90 tablet 3    hydrochlorothiazide (HYDRODIURIL) 25 MG tablet Take 1 tablet by mouth daily 90 tablet 3    lisinopril (PRINIVIL;ZESTRIL) 40 MG tablet Take 1 tablet by mouth daily 90 tablet 3    SM ASPIRIN ADULT LOW STRENGTH 81 MG EC tablet TAKE 1 TABLET BY MOUTH ONE TIME A DAY  30 tablet 0    nitroGLYCERIN (NITROSTAT) 0.4 MG SL tablet Place 1 tablet under the tongue every 5 minutes as needed for Chest pain up to max of 3 total doses. If no relief after 1 dose, call 911. 25 tablet 3     No current facility-administered medications for this visit. Reviewed with patient and will remain unchanged except as mentioned in A/P  PHYSICAL EXAM     There were no vitals filed for this visit.      ASSESSMENT AND PLAN     ~CAD   Date EF Results   Sx   No concerning   Britni Earing   [x]I         []II           []III          []IV   Hx 1/14  CABGx3 LIMA-LAD, SVG-D1, SVG-OM2 Pedro Flair)   C 1/14 8/17 60%  55% MVD-> CABG Nancy Minors)  SVG-OM occluded - medical management Nancy Minors)   MPI 10/15 61% Lateral ischemia/scar   TTE 5/18 55%    Plan   Continue aggressive medical treatment at doses above   ~HTN  Today BP [x] Controlled [] Borderline [] Uncontrolled   Counseling [x] Diet/Salt [x] Exercise [x] Weight    Plan Continue current medications at doses detailed above  ~Hyperchol  Goal LDL [] <100 [x] <70     Counseling [x] Diet [x] Weight [x] Exercise   Lipid/liver Monitor [x] PCP [] Cardio [] Endocrine   Plan Reduce crestor to 20mg  9/17 HDL:57, LDL:93  ~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

## 2019-04-15 NOTE — PATIENT INSTRUCTIONS
~CAD   Date EF Results   Sx   No concerning   Kala Engel   [x]I         []II           []III          []IV   Hx 1/14  CABGx3 LIMA-LAD, SVG-D1, SVG-OM2 Nighat Dubin)   [facility] 1/14 8/17 60%  55% MVD-> CABG Consuelo Yeung)  SVG-OM occluded - medical management Consuelo Yeung)   MPI 10/15 61% Lateral ischemia/scar   TTE 5/18 55%    Plan   Continue aggressive medical treatment at doses above   ~HTN  Today BP [x] Controlled [] Borderline [] Uncontrolled   Counseling [x] Diet/Salt [x] Exercise [x] Weight    Plan Continue current medications at doses detailed above  ~Hyperchol  Goal LDL [] <100 [x] <70     Counseling [x] Diet [x] Weight [x] Exercise   Lipid/liver Monitor [x] PCP [] Cardio [] Endocrine   Plan Reduce crestor to 20mg  9/17 HDL:57, LDL:93  ~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 Millie Cook, Hematologist  ~Peripheral tingling  Check BMP, MG  Reduced crestor to 20mg as correlates with titration

## 2019-04-19 ENCOUNTER — APPOINTMENT (OUTPATIENT)
Dept: GENERAL RADIOLOGY | Age: 59
DRG: 247 | End: 2019-04-19
Payer: COMMERCIAL

## 2019-04-19 ENCOUNTER — HOSPITAL ENCOUNTER (INPATIENT)
Age: 59
LOS: 4 days | Discharge: HOME | DRG: 247 | End: 2019-04-23
Attending: EMERGENCY MEDICINE | Admitting: FAMILY MEDICINE
Payer: COMMERCIAL

## 2019-04-19 ENCOUNTER — OFFICE VISIT (OUTPATIENT)
Dept: CARDIOLOGY CLINIC | Age: 59
End: 2019-04-19
Payer: COMMERCIAL

## 2019-04-19 VITALS
HEIGHT: 66 IN | DIASTOLIC BLOOD PRESSURE: 80 MMHG | HEART RATE: 74 BPM | SYSTOLIC BLOOD PRESSURE: 120 MMHG | OXYGEN SATURATION: 96 % | BODY MASS INDEX: 28.12 KG/M2 | WEIGHT: 175 LBS

## 2019-04-19 DIAGNOSIS — E78.00 HYPERCHOLESTEREMIA: ICD-10-CM

## 2019-04-19 DIAGNOSIS — I10 ESSENTIAL HYPERTENSION: Primary | ICD-10-CM

## 2019-04-19 DIAGNOSIS — R07.9 ACUTE CHEST PAIN: Primary | ICD-10-CM

## 2019-04-19 DIAGNOSIS — I20.0 UNSTABLE ANGINA (HCC): ICD-10-CM

## 2019-04-19 DIAGNOSIS — R94.31 ABNORMAL EKG: ICD-10-CM

## 2019-04-19 DIAGNOSIS — I25.10 CORONARY ARTERY DISEASE INVOLVING NATIVE CORONARY ARTERY OF NATIVE HEART WITHOUT ANGINA PECTORIS: ICD-10-CM

## 2019-04-19 LAB
A/G RATIO: 1.3 (ref 1.1–2.2)
ALBUMIN SERPL-MCNC: 4.2 G/DL (ref 3.4–5)
ALP BLD-CCNC: 65 U/L (ref 40–129)
ALT SERPL-CCNC: 37 U/L (ref 10–40)
ANION GAP SERPL CALCULATED.3IONS-SCNC: 11 MMOL/L (ref 3–16)
APTT: 31.2 SEC (ref 26–36)
AST SERPL-CCNC: 37 U/L (ref 15–37)
BANDED NEUTROPHILS RELATIVE PERCENT: 2 % (ref 0–7)
BASOPHILS ABSOLUTE: 0.1 K/UL (ref 0–0.2)
BASOPHILS RELATIVE PERCENT: 1 %
BILIRUB SERPL-MCNC: 0.5 MG/DL (ref 0–1)
BUN BLDV-MCNC: 15 MG/DL (ref 7–20)
CALCIUM SERPL-MCNC: 9.4 MG/DL (ref 8.3–10.6)
CHLORIDE BLD-SCNC: 104 MMOL/L (ref 99–110)
CO2: 25 MMOL/L (ref 21–32)
CREAT SERPL-MCNC: 0.7 MG/DL (ref 0.6–1.2)
EOSINOPHILS ABSOLUTE: 0.2 K/UL (ref 0–0.6)
EOSINOPHILS RELATIVE PERCENT: 2 %
GFR AFRICAN AMERICAN: >60
GFR NON-AFRICAN AMERICAN: >60
GLOBULIN: 3.2 G/DL
GLUCOSE BLD-MCNC: 121 MG/DL (ref 70–99)
HCT VFR BLD CALC: 48.2 % (ref 36–48)
HEMOGLOBIN: 16.2 G/DL (ref 12–16)
INR BLD: 0.92 (ref 0.86–1.14)
LYMPHOCYTES ABSOLUTE: 1.2 K/UL (ref 1–5.1)
LYMPHOCYTES RELATIVE PERCENT: 12 %
MCH RBC QN AUTO: 32.1 PG (ref 26–34)
MCHC RBC AUTO-ENTMCNC: 33.7 G/DL (ref 31–36)
MCV RBC AUTO: 95.3 FL (ref 80–100)
MONOCYTES ABSOLUTE: 0.6 K/UL (ref 0–1.3)
MONOCYTES RELATIVE PERCENT: 6 %
NEUTROPHILS ABSOLUTE: 7.8 K/UL (ref 1.7–7.7)
NEUTROPHILS RELATIVE PERCENT: 77 %
PDW BLD-RTO: 14.1 % (ref 12.4–15.4)
PLATELET # BLD: 218 K/UL (ref 135–450)
PLATELET SLIDE REVIEW: ADEQUATE
PMV BLD AUTO: 10.4 FL (ref 5–10.5)
POTASSIUM SERPL-SCNC: 3.6 MMOL/L (ref 3.5–5.1)
PRO-BNP: 108 PG/ML (ref 0–124)
PROTHROMBIN TIME: 10.5 SEC (ref 9.8–13)
RBC # BLD: 5.05 M/UL (ref 4–5.2)
RBC # BLD: NORMAL 10*6/UL
SLIDE REVIEW: ABNORMAL
SODIUM BLD-SCNC: 140 MMOL/L (ref 136–145)
TOTAL PROTEIN: 7.4 G/DL (ref 6.4–8.2)
TROPONIN: <0.01 NG/ML
WBC # BLD: 9.9 K/UL (ref 4–11)

## 2019-04-19 PROCEDURE — G8598 ASA/ANTIPLAT THER USED: HCPCS | Performed by: INTERNAL MEDICINE

## 2019-04-19 PROCEDURE — 99214 OFFICE O/P EST MOD 30 MIN: CPT | Performed by: INTERNAL MEDICINE

## 2019-04-19 PROCEDURE — 85610 PROTHROMBIN TIME: CPT

## 2019-04-19 PROCEDURE — 71046 X-RAY EXAM CHEST 2 VIEWS: CPT

## 2019-04-19 PROCEDURE — 1200000000 HC SEMI PRIVATE

## 2019-04-19 PROCEDURE — 85025 COMPLETE CBC W/AUTO DIFF WBC: CPT

## 2019-04-19 PROCEDURE — 6370000000 HC RX 637 (ALT 250 FOR IP): Performed by: PHYSICIAN ASSISTANT

## 2019-04-19 PROCEDURE — 6370000000 HC RX 637 (ALT 250 FOR IP): Performed by: FAMILY MEDICINE

## 2019-04-19 PROCEDURE — 3017F COLORECTAL CA SCREEN DOC REV: CPT | Performed by: INTERNAL MEDICINE

## 2019-04-19 PROCEDURE — 83880 ASSAY OF NATRIURETIC PEPTIDE: CPT

## 2019-04-19 PROCEDURE — 80053 COMPREHEN METABOLIC PANEL: CPT

## 2019-04-19 PROCEDURE — G8427 DOCREV CUR MEDS BY ELIG CLIN: HCPCS | Performed by: INTERNAL MEDICINE

## 2019-04-19 PROCEDURE — 99285 EMERGENCY DEPT VISIT HI MDM: CPT

## 2019-04-19 PROCEDURE — 93005 ELECTROCARDIOGRAM TRACING: CPT | Performed by: EMERGENCY MEDICINE

## 2019-04-19 PROCEDURE — 2580000003 HC RX 258: Performed by: FAMILY MEDICINE

## 2019-04-19 PROCEDURE — 6360000002 HC RX W HCPCS: Performed by: FAMILY MEDICINE

## 2019-04-19 PROCEDURE — 1036F TOBACCO NON-USER: CPT | Performed by: INTERNAL MEDICINE

## 2019-04-19 PROCEDURE — 93000 ELECTROCARDIOGRAM COMPLETE: CPT | Performed by: INTERNAL MEDICINE

## 2019-04-19 PROCEDURE — 36415 COLL VENOUS BLD VENIPUNCTURE: CPT

## 2019-04-19 PROCEDURE — 84484 ASSAY OF TROPONIN QUANT: CPT

## 2019-04-19 PROCEDURE — 94760 N-INVAS EAR/PLS OXIMETRY 1: CPT

## 2019-04-19 PROCEDURE — 85730 THROMBOPLASTIN TIME PARTIAL: CPT

## 2019-04-19 PROCEDURE — G8417 CALC BMI ABV UP PARAM F/U: HCPCS | Performed by: INTERNAL MEDICINE

## 2019-04-19 RX ORDER — NITROGLYCERIN 0.4 MG/1
0.4 TABLET SUBLINGUAL EVERY 5 MIN PRN
Status: DISCONTINUED | OUTPATIENT
Start: 2019-04-19 | End: 2019-04-19 | Stop reason: SDUPTHER

## 2019-04-19 RX ORDER — SODIUM CHLORIDE 0.9 % (FLUSH) 0.9 %
10 SYRINGE (ML) INJECTION PRN
Status: DISCONTINUED | OUTPATIENT
Start: 2019-04-19 | End: 2019-04-23 | Stop reason: HOSPADM

## 2019-04-19 RX ORDER — ONDANSETRON 2 MG/ML
4 INJECTION INTRAMUSCULAR; INTRAVENOUS EVERY 6 HOURS PRN
Status: DISCONTINUED | OUTPATIENT
Start: 2019-04-19 | End: 2019-04-23 | Stop reason: HOSPADM

## 2019-04-19 RX ORDER — LISINOPRIL 10 MG/1
40 TABLET ORAL DAILY
Status: DISCONTINUED | OUTPATIENT
Start: 2019-04-19 | End: 2019-04-23 | Stop reason: HOSPADM

## 2019-04-19 RX ORDER — ACETAMINOPHEN 325 MG/1
650 TABLET ORAL EVERY 4 HOURS PRN
Status: DISCONTINUED | OUTPATIENT
Start: 2019-04-19 | End: 2019-04-23 | Stop reason: HOSPADM

## 2019-04-19 RX ORDER — HYDROCHLOROTHIAZIDE 25 MG/1
25 TABLET ORAL DAILY
Status: DISCONTINUED | OUTPATIENT
Start: 2019-04-19 | End: 2019-04-23 | Stop reason: HOSPADM

## 2019-04-19 RX ORDER — SODIUM CHLORIDE 0.9 % (FLUSH) 0.9 %
10 SYRINGE (ML) INJECTION EVERY 12 HOURS SCHEDULED
Status: DISCONTINUED | OUTPATIENT
Start: 2019-04-19 | End: 2019-04-23 | Stop reason: HOSPADM

## 2019-04-19 RX ORDER — NITROGLYCERIN 0.4 MG/1
0.4 TABLET SUBLINGUAL EVERY 5 MIN PRN
Status: DISCONTINUED | OUTPATIENT
Start: 2019-04-19 | End: 2019-04-23 | Stop reason: HOSPADM

## 2019-04-19 RX ORDER — MORPHINE SULFATE 2 MG/ML
2 INJECTION, SOLUTION INTRAMUSCULAR; INTRAVENOUS EVERY 4 HOURS PRN
Status: DISCONTINUED | OUTPATIENT
Start: 2019-04-19 | End: 2019-04-23 | Stop reason: HOSPADM

## 2019-04-19 RX ORDER — ROSUVASTATIN CALCIUM 20 MG/1
20 TABLET, COATED ORAL EVERY EVENING
Status: DISCONTINUED | OUTPATIENT
Start: 2019-04-19 | End: 2019-04-23 | Stop reason: HOSPADM

## 2019-04-19 RX ORDER — ASPIRIN 81 MG/1
324 TABLET, CHEWABLE ORAL ONCE
Status: COMPLETED | OUTPATIENT
Start: 2019-04-19 | End: 2019-04-19

## 2019-04-19 RX ORDER — ASPIRIN 81 MG/1
81 TABLET, CHEWABLE ORAL DAILY
Status: DISCONTINUED | OUTPATIENT
Start: 2019-04-19 | End: 2019-04-23 | Stop reason: HOSPADM

## 2019-04-19 RX ADMIN — ROSUVASTATIN CALCIUM 20 MG: 20 TABLET, FILM COATED ORAL at 21:13

## 2019-04-19 RX ADMIN — Medication 10 ML: at 21:13

## 2019-04-19 RX ADMIN — HYDROCHLOROTHIAZIDE 25 MG: 25 TABLET ORAL at 18:41

## 2019-04-19 RX ADMIN — ASPIRIN 81 MG 324 MG: 81 TABLET ORAL at 16:57

## 2019-04-19 RX ADMIN — NITROGLYCERIN 1 INCH: 20 OINTMENT TOPICAL at 16:57

## 2019-04-19 RX ADMIN — ENOXAPARIN SODIUM 40 MG: 40 INJECTION SUBCUTANEOUS at 18:41

## 2019-04-19 ASSESSMENT — PAIN SCALES - GENERAL
PAINLEVEL_OUTOF10: 2
PAINLEVEL_OUTOF10: 3

## 2019-04-19 ASSESSMENT — PAIN DESCRIPTION - DESCRIPTORS
DESCRIPTORS: ACHING
DESCRIPTORS: CRUSHING

## 2019-04-19 ASSESSMENT — ENCOUNTER SYMPTOMS
STRIDOR: 0
COUGH: 0
WHEEZING: 0
SHORTNESS OF BREATH: 1
CONSTIPATION: 0
ABDOMINAL PAIN: 0
ABDOMINAL DISTENTION: 0
NAUSEA: 0
VOMITING: 0
ALLERGIC/IMMUNOLOGIC NEGATIVE: 1
COLOR CHANGE: 0
BACK PAIN: 0
DIARRHEA: 0

## 2019-04-19 ASSESSMENT — PAIN DESCRIPTION - FREQUENCY: FREQUENCY: CONTINUOUS

## 2019-04-19 ASSESSMENT — PAIN DESCRIPTION - PAIN TYPE
TYPE: ACUTE PAIN
TYPE: ACUTE PAIN

## 2019-04-19 ASSESSMENT — PAIN DESCRIPTION - LOCATION
LOCATION: CHEST
LOCATION: CHEST

## 2019-04-19 ASSESSMENT — PAIN DESCRIPTION - ORIENTATION: ORIENTATION: MID

## 2019-04-19 NOTE — PROGRESS NOTES
[address]       H+P // CONSULT // OUTPATIENT VISIT // Becca Alvarado     Referring Doctor Devin Kilgore [] Acute [x] Chronic     Symptom [] None [x] CP [x] SOB [] Dizzy [] Palps [] Fatigue     Problems CAD s/p CABG, HTN, CHOL      HISTORY OF PRESENT ILLNESS     Reports new onset cp last two weeks. CP substernal, pressure, radiating to arms, worse with exertion, better with rest, crescendo in severity and frequency. Hx of cabg X3 with failed graft X2. EKG in office without acute changes. COMPLIANCE     Category Meds Diet Salt Exercise Tobacco Alcohol Drugs   Compliant [x] [x] [x] [] [x] [x] [x]   [x]Counseling given on all above above categories    HISTORY/ALLERGIES/ROS     MedHx:  has a past medical history of CAD (coronary artery disease), Disease of blood and blood forming organ, Hyperlipidemia, and Hypertension. SurgHx:  has a past surgical history that includes Appendectomy; Knee arthroscopy (Left, ); Carpal tunnel release (Bilateral, );  section; Tonsillectomy; Coronary artery bypass graft (14 emergent); Splenectomy (12/15); Cardiac surgery; and Coronary angioplasty (2017). SocHx:   reports that she quit smoking about 23 years ago. She started smoking about 42 years ago. She has a 40.00 pack-year smoking history. She has never used smokeless tobacco. She reports that she drinks alcohol. She reports that she does not use drugs. FamHx: family history includes High Blood Pressure in her mother. Allergies: Eggs or egg-derived products [eggs or egg-derived products];  Imdur [isosorbide dinitrate]; and Poultry meal   ROS:  [x]Full ROS obtained and negative except as mentioned in HPI     MEDICATIONS      Current Outpatient Medications   Medication Sig Dispense Refill    rosuvastatin (CRESTOR) 20 MG tablet Take 1 tablet by mouth every evening 90 tablet 3    hydrochlorothiazide (HYDRODIURIL) 25 MG tablet Take 1 tablet by mouth daily 90 tablet 3    lisinopril (PRINIVIL;ZESTRIL) 40 MG tablet Take 1 tablet by mouth daily 90 tablet 3    SM ASPIRIN ADULT LOW STRENGTH 81 MG EC tablet TAKE 1 TABLET BY MOUTH ONE TIME A DAY  30 tablet 0    nitroGLYCERIN (NITROSTAT) 0.4 MG SL tablet Place 1 tablet under the tongue every 5 minutes as needed for Chest pain up to max of 3 total doses. If no relief after 1 dose, call 911. 25 tablet 3     No current facility-administered medications for this visit. Reviewed with patient and will remain unchanged except as mentioned in A/P  PHYSICAL EXAM     Vitals:    04/19/19 1521   BP: 120/80   Pulse: 74   SpO2: 96%      Gen Alert, coop, no distress Heart  RRR, no MRG, nl apical impulse   Head NC, AT, no abnorm Abd  Soft, NT, +BS, no mass, no OM   Eyes PER, conj/corn clear Ext  Ext nl, AT, no C/C/E   Nose Nares nl, no drain, NT Pulse 2+ and symmetric   Throat Lips, mucosa, tongue nl Skin Col/text/turg nl, no vis rash/les   Neck S/S, TM, NT, no bruit/JVD Psych Nl mood and affect   Lung CTA-B, unlabored, no DTP Lymph   No cervical or axillary LA   Ch wall NT, no deform Neuro  Nl gross M/S exam     ASSESSMENT AND PLAN     ~CAD/Unstable angina   Date EF Results   Sx   Unstable angina   [facility]   [x]I         []II           []III          []IV   Hx 1/14  CABG   [facility] 1/14 8/17   55% MVD  SVG-OM occluded - medical management   MPI 10/15 61% Lateral ischemia/scar   TTE 5/18 55%    Plan   Continue aggressive medical treatment at doses above  Refer to ER for unstable angina  Plan for [facility] Monday, sooner with instability.    ~HTN  Today BP [x] Controlled [] Borderline [] Uncontrolled   Counseling [x] Diet/Salt [x] Exercise [x] Weight    Plan Continue current medications at doses detailed above  ~Hyperchol  Goal LDL [] <100 [x] <70     Counseling [x] Diet [x] Weight [x] Exercise   Lipid/liver Monitor [x] PCP [] Cardio [] Endocrine   Plan Reduce crestor to 20mg  9/17 HDL:57, LDL:93  ~Devang

## 2019-04-19 NOTE — ED NOTES
ED ACUTE CARE RN EVALUATION:  >Pt evaluation initiated in Acute Care area by RN and ERMD.  > All orders placed by ED provider team based on pt POC r/t chief complaint and initial assessment are implemented upon my acknowledgement in order history. >See eMAR for administration of medications as ordered by provider team.  >See Doc Flowsheet for initial physical assessment and continued monitoring based on patient chief complaint and associated symptoms. >Any further NOTES made by me on this pt's visit will only be done when there is a deviation of the patient's baseline acuity, expected outcome or from any normal nursing practice or hospital protocol. Pt into ED with c/o chest pain, midsternal/left and middle of back that has gotten worse over the past 48 hours. Pt states she had an MI prior and the s/s were the same.       Severo Zavala RN  04/19/19 9102

## 2019-04-19 NOTE — ED NOTES
Pharmacy Medication History Note      List of current medications patient is taking is complete. Source of information: patient    Changes made to medication list:  Medications flagged for removal (include reason, ex. noncompliance):  N/A    Medications removed (include reason, ex. therapy complete or physician discontinued):  N/A    Medications added/doses adjusted:  N/A    Other notes (ex. Recent course of antibiotics, Coumadin dosing):  Denies use of other OTC or herbal medications. Last dose times updated. Kamille [facility]    No current facility-administered medications on file prior to encounter. Current Outpatient Medications on File Prior to Encounter   Medication Sig Dispense Refill    rosuvastatin (CRESTOR) 20 MG tablet Take 1 tablet by mouth every evening 90 tablet 3    hydrochlorothiazide (HYDRODIURIL) 25 MG tablet Take 1 tablet by mouth daily 90 tablet 3    lisinopril (PRINIVIL;ZESTRIL) 40 MG tablet Take 1 tablet by mouth daily 90 tablet 3    SM ASPIRIN ADULT LOW STRENGTH 81 MG EC tablet TAKE 1 TABLET BY MOUTH ONE TIME A DAY  30 tablet 0    nitroGLYCERIN (NITROSTAT) 0.4 MG SL tablet Place 1 tablet under the tongue every 5 minutes as needed for Chest pain up to max of 3 total doses.  If no relief after 1 dose, call 911. 25 tablet 3    [DISCONTINUED] amLODIPine (NORVASC) 5 MG tablet TAKE 1 TABLET BY MOUTH ONE TIME A DAY  30 tablet 4

## 2019-04-19 NOTE — LETTER
Allergies: Eggs or egg-derived products [eggs or egg-derived products]; Imdur [isosorbide dinitrate]; and Poultry meal   ROS:  [x]Full ROS obtained and negative except as mentioned in HPI     MEDICATIONS      Current Outpatient Medications   Medication Sig Dispense Refill    rosuvastatin (CRESTOR) 20 MG tablet Take 1 tablet by mouth every evening 90 tablet 3    hydrochlorothiazide (HYDRODIURIL) 25 MG tablet Take 1 tablet by mouth daily 90 tablet 3    lisinopril (PRINIVIL;ZESTRIL) 40 MG tablet Take 1 tablet by mouth daily 90 tablet 3    SM ASPIRIN ADULT LOW STRENGTH 81 MG EC tablet TAKE 1 TABLET BY MOUTH ONE TIME A DAY  30 tablet 0    nitroGLYCERIN (NITROSTAT) 0.4 MG SL tablet Place 1 tablet under the tongue every 5 minutes as needed for Chest pain up to max of 3 total doses. If no relief after 1 dose, call 911. 25 tablet 3     No current facility-administered medications for this visit.       Reviewed with patient and will remain unchanged except as mentioned in A/P  PHYSICAL EXAM     Vitals:    04/19/19 1521   BP: 120/80   Pulse: 74   SpO2: 96%      Gen Alert, coop, no distress Heart  RRR, no MRG, nl apical impulse   Head NC, AT, no abnorm Abd  Soft, NT, +BS, no mass, no OM   Eyes PER, conj/corn clear Ext  Ext nl, AT, no C/C/E   Nose Nares nl, no drain, NT Pulse 2+ and symmetric   Throat Lips, mucosa, tongue nl Skin Col/text/turg nl, no vis rash/les   Neck S/S, TM, NT, no bruit/JVD Psych Nl mood and affect   Lung CTA-B, unlabored, no DTP Lymph   No cervical or axillary LA   Ch wall NT, no deform Neuro  Nl gross M/S exam     ASSESSMENT AND PLAN     ~CAD/Unstable angina   Date EF Results   Sx   Unstable angina   [facility]   [x]I         []II           []III          []IV   Hx 1/14  CABG   [facility] 1/14 8/17   55% MVD  SVG-OM occluded - medical management   MPI 10/15 61% Lateral ischemia/scar   TTE 5/18 55%    Plan   Continue aggressive medical treatment at doses above  Refer to ER for unstable angina Plan for [facility] Monday, sooner with instability. ~HTN  Today BP [x] Controlled [] Borderline [] Uncontrolled   Counseling [x] Diet/Salt [x] Exercise [x] Weight    Plan Continue current medications at doses detailed above  ~Hyperchol  Goal LDL [] <100 [x] <70     Counseling [x] Diet [x] Weight [x] Exercise   Lipid/liver Monitor [x] PCP [] Cardio [] Endocrine   Plan Reduce crestor to 20mg  9/17 HDL:57, LDL:93  ~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 Nurys Srivastava, Hematologist  ~Followup  Interval: TBD  Tests/Labs: None    Scribe Attestation  Pamela RIVAS, am scribing for and in the presence of Maikel Carlin MD.   SignedPamela 04/19/19 3:36 PM   Provider Amanda Lutz is working as a scribe for and in the presence of me (Maikel Carlin MD). Working as a scribe, Pamela Zayas may have prepopulated components of this note with my historical  intellectual property under my direct supervision. Any additions to this intellectual property were performed in my presence and at my direction. Furthermore, the content and accuracy of this note have been reviewed by me Maikel Carlin MD). If you have questions, please do not hesitate to call me. I look forward to following Randee Sibley along with you.     Sincerely,        Esequiel Nicholson MD

## 2019-04-19 NOTE — ED PROVIDER NOTES
Final Result   No evidence for acute cardiopulmonary pathology. EKG:  Read by me in the absence of a cardiologist shows:  Sinus rhythm, rate 72, low voltages, nonspecific ST-T wave abnormality, comparison with 12 May 2014 shows mild change in lead V2    Medications administered:  Medications   nitroglycerin (NITRO-BID) 2 % ointment 1 inch (1 inch Topical Given 4/19/19 1657)   aspirin chewable tablet 324 mg (324 mg Oral Given 4/19/19 1657)     FINAL IMPRESSION:    1. Acute chest pain    2.  Abnormal EKG       DISPOSITION Admitted 04/19/2019 05:31:04 PM        Mayra Pitts MD  04/19/19 8094

## 2019-04-19 NOTE — ED PROVIDER NOTES
[address] [facility]  eMERGENCY dEPARTMENT eNCOUnter        Pt Name: Rachael Mary  MRN: [id number]  [place] 1960  Date of evaluation: 4/19/2019  Provider: Mirtha Menchaca PA-C  PCP: Dea Holter    This patient was seen and evaluated by the attending physician Jaqueline Wright, *. CHIEF COMPLAINT       Chief Complaint   Patient presents with    Chest Pain     SENT UP FROM DR. Kayce Alegria OFFICE FOR CP PAST TWO WEEK. WORSE TODAY. HISTORY OF PRESENT ILLNESS   (Location/Symptom, Timing/Onset, Context/Setting, Quality, Duration, Modifying Factors, Severity)  Note limiting factors. Rahcael Mary is a 60 y.o. female with history of coronary artery disease, hypertension and hyperlipidemia who presents to the emergency department complaining of intermittent chest pain primarily with exertion for several weeks. Even just walking from the parking lot to the emergency department, she had chest discomfort and shortness of breath. She describes it as a pressure sensation. She denies cough, congestion, hemoptysis, palpitations, fever, chills, abdominal pain, nausea, vomiting, pain or swelling in extremities. She was advised by her cardiologist, Dr. Shaista Cm, to be admitted to get an angiogram. She is chest pain free at this time. Nursing Notes were all reviewed and agreed with or any disagreements were addressed  in the HPI. REVIEW OF SYSTEMS    (2-9 systems for level 4, 10 or more for level 5)     Review of Systems   Constitutional: Positive for fatigue. Negative for chills and fever. HENT: Negative. Eyes: Negative for visual disturbance. Respiratory: Positive for shortness of breath. Negative for cough, wheezing and stridor. Cardiovascular: Positive for chest pain. Negative for palpitations and leg swelling. Gastrointestinal: Negative for abdominal distention, abdominal pain, constipation, diarrhea, nausea and vomiting.    Endocrine: Negative. Genitourinary: Negative. Musculoskeletal: Negative for back pain, neck pain and neck stiffness. Skin: Negative for color change, pallor, rash and wound. Allergic/Immunologic: Negative. Neurological: Positive for light-headedness. Negative for dizziness, tremors, seizures, syncope, facial asymmetry, speech difficulty, weakness, numbness and headaches. Hematological: Negative. Psychiatric/Behavioral: Negative for confusion. All other systems reviewed and are negative. Positives and Pertinent negatives as per HPI. Except as noted abovein the ROS, all other systems were reviewed and negative. PAST MEDICAL HISTORY     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    Hyperlipidemia     Hypertension          SURGICAL HISTORY     Past Surgical History:   Procedure Laterality Date    APPENDECTOMY      CARDIAC SURGERY      14 CABG x 3    CARPAL TUNNEL RELEASE Bilateral      SECTION      CORONARY ANGIOPLASTY  2017    CORONARY ARTERY BYPASS GRAFT  14 emergent    CABGx3 R-SVG/LIMA    KNEE ARTHROSCOPY Left     SPLENECTOMY  12/15    [facility] [place]    TONSILLECTOMY           CURRENTMEDICATIONS       Previous Medications    HYDROCHLOROTHIAZIDE (HYDRODIURIL) 25 MG TABLET    Take 1 tablet by mouth daily    LISINOPRIL (PRINIVIL;ZESTRIL) 40 MG TABLET    Take 1 tablet by mouth daily    NITROGLYCERIN (NITROSTAT) 0.4 MG SL TABLET    Place 1 tablet under the tongue every 5 minutes as needed for Chest pain up to max of 3 total doses. If no relief after 1 dose, call 911. ROSUVASTATIN (CRESTOR) 20 MG TABLET    Take 1 tablet by mouth every evening    SM ASPIRIN ADULT LOW STRENGTH 81 MG EC TABLET    TAKE 1 TABLET BY MOUTH ONE TIME A DAY          ALLERGIES     Eggs or egg-derived products [eggs or egg-derived products];  Imdur [isosorbide dinitrate]; and [employer] meal    FAMILYHISTORY       Family History   Problem Relation Age of Onset    High Blood Pressure Mother     Heart Disease Neg Hx     Stroke Neg Hx           SOCIAL HISTORY       Social History     Socioeconomic History    Marital status:      Spouse name: None    Number of children: None    Years of education: None    Highest education level: None   Occupational History    None   Social Needs    Financial resource strain: None    Food insecurity:     Worry: None     Inability: None    Transportation needs:     Medical: None     Non-medical: None   Tobacco Use    Smoking status: Former Smoker     Packs/day: 2.00     Years: 20.00     Pack years: 40.00     Start date: 1977     Last attempt to quit: 1996     Years since quittin.2    Smokeless tobacco: Never Used   Substance and Sexual Activity    Alcohol use: Yes     Types: 6 Cans of beer, 2 Shots of liquor per week     Comment: weekends    Drug use: No    Sexual activity: None   Lifestyle    Physical activity:     Days per week: None     Minutes per session: None    Stress: None   Relationships    Social connections:     Talks on phone: None     Gets together: None     Attends Restorationist service: None     Active member of club or organization: None     Attends meetings of clubs or organizations: None     Relationship status: None    Intimate partner violence:     Fear of current or ex partner: None     Emotionally abused: None     Physically abused: None     Forced sexual activity: None   Other Topics Concern    None   Social History Narrative    None       SCREENINGS             PHYSICAL EXAM    (up to 7 for level 4, 8 or more for level 5)     ED Triage Vitals [19 1611]   BP Temp Temp Source Pulse Resp SpO2 Height Weight   124/79 98 °F (36.7 °C) Infrared 69 14 95 % 5' 6\" (1.676 m) 175 lb (79.4 kg)       Physical Exam   Constitutional: She is oriented to person, place, and time.  She appears well-developed and well-nourished. No distress. HENT:   Head: Normocephalic and atraumatic. Right Ear: External ear normal.   Left Ear: External ear normal.   Nose: Nose normal.   Mouth/Throat: Oropharynx is clear and moist.   Eyes: Pupils are equal, round, and reactive to light. Conjunctivae and EOM are normal. Right eye exhibits no discharge. Left eye exhibits no discharge. No scleral icterus. Neck: Normal range of motion. No JVD present. Cardiovascular: Normal rate and regular rhythm. Pulmonary/Chest: Effort normal and breath sounds normal.   Abdominal: Soft. Bowel sounds are normal. She exhibits no distension. There is no tenderness. Musculoskeletal: Normal range of motion. No extremity edema, posterior calf or thigh tenderness, palpable cord, discoloration. Negative homans. Lymphadenopathy:     She has no cervical adenopathy. Neurological: She is alert and oriented to person, place, and time. She displays normal reflexes. No cranial nerve deficit (II-XII intact) or sensory deficit. Skin: Skin is warm and dry. Capillary refill takes less than 2 seconds. No rash noted. She is not diaphoretic. No erythema. No pallor. Psychiatric: She has a normal mood and affect. Her behavior is normal.   Nursing note and vitals reviewed. DIAGNOSTIC RESULTS   LABS:    Labs Reviewed   CBC WITH AUTO DIFFERENTIAL - Abnormal; Notable for the following components:       Result Value    Hemoglobin 16.2 (*)     Hematocrit 48.2 (*)     All other components within normal limits    Narrative:     Performed at:  [facility] [address], [facility], [address]   Phone [phone]   COMPREHENSIVE METABOLIC PANEL   APTT   TROPONIN   PROTIME-INR   BRAIN NATRIURETIC PEPTIDE       All other labs were within normal range or not returned as of this dictation. EKG:  All EKG's are interpreted by the Emergency Department Physician who either signs orCo-signs this chart in the absence of a cardiologist. Please see their note for interpretation of EKG. RADIOLOGY:   Non-plain film images such as CT, Ultrasound and MRI are read by the radiologist. Plain radiographic images are visualized andpreliminarily interpreted by the  ED Provider with the below findings:        Interpretation [facility] Radiologist below, if available at the time of this note:    XR CHEST STANDARD (2 VW) (Final result)   Result time 04/19/19 17:29:30   Final result by Sim Sadler MD (04/19/19 17:29:30)                Impression:    No evidence for acute cardiopulmonary pathology. PROCEDURES   Unless otherwise noted below, none     Procedures    CRITICAL CARE TIME   N/A    CONSULTS:  Dr. Dev Waltres will admit    [address] [place] [address] and DIFFERENTIALDIAGNOSIS/MDM:   Vitals:    Vitals:    04/19/19 1611   BP: 124/79   Pulse: 69   Resp: 14   Temp: 98 °F (36.7 °C)   TempSrc: Infrared   SpO2: 95%   Weight: 175 lb (79.4 kg)   Height: 5' 6\" (1.676 m)       Patient was given thefollowing medications:  Medications   nitroglycerin (NITRO-BID) 2 % ointment 1 inch (has no administration in time range)   aspirin chewable tablet 324 mg (has no administration in time range)       This patient presents complaining of chest pain and shortness of breath with exertion. At this time, she is chest pain-free. We initiated aspirin and nitroglycerin paste, which she tolerates well without immediate complications or emesis. EKG does show subtle changes. No acute ST elevation however. Troponin is negative. cxr stable. Given her coronary history, her cardiologist recommends admission for further testing. hospitalist will admit. We have addressed concerns and expectations.  My suspicion is low for STEMI, PE, myocarditis, pericarditis, endocarditis, acute pulmonary edema, pleural effusion, pericardial effusion, cardiac tamponade, overt CHF exacerbation, thoracic aortic dissection, esophageal rupture, other life-threatening arrhythmia, hypertensive urgency or emergency, hemothorax, pulmonary contusion, subcutaneous emphysema, flail chest, pneumo mediastinum, rib fracture, pneumonia, stroke, or other concerning pathology. FINAL IMPRESSION      1. Acute chest pain    2. Abnormal EKG          DISPOSITION/PLAN   DISPOSITION  Decision to admit      PATIENT REFERREDTO:  No follow-up provider specified.     DISCHARGE MEDICATIONS:  New Prescriptions    No medications on file       DISCONTINUED MEDICATIONS:  Discontinued Medications    No medications on file              (Please note that portions ofthis note were completed with a voice recognition program.  Efforts were made to edit the dictations but occasionally words are mis-transcribed.)    Linda Renteria PA-C (electronically signed)           Linda Renteria PA-C  04/19/19 5458

## 2019-04-20 LAB
EKG ATRIAL RATE: 72 BPM
EKG DIAGNOSIS: NORMAL
EKG P AXIS: 38 DEGREES
EKG P-R INTERVAL: 152 MS
EKG Q-T INTERVAL: 388 MS
EKG QRS DURATION: 78 MS
EKG QTC CALCULATION (BAZETT): 424 MS
EKG R AXIS: 5 DEGREES
EKG T AXIS: 27 DEGREES
EKG VENTRICULAR RATE: 72 BPM

## 2019-04-20 PROCEDURE — 6370000000 HC RX 637 (ALT 250 FOR IP): Performed by: HOSPITALIST

## 2019-04-20 PROCEDURE — 99232 SBSQ HOSP IP/OBS MODERATE 35: CPT | Performed by: NURSE PRACTITIONER

## 2019-04-20 PROCEDURE — 2580000003 HC RX 258: Performed by: FAMILY MEDICINE

## 2019-04-20 PROCEDURE — 93010 ELECTROCARDIOGRAM REPORT: CPT | Performed by: INTERNAL MEDICINE

## 2019-04-20 PROCEDURE — 6360000002 HC RX W HCPCS: Performed by: FAMILY MEDICINE

## 2019-04-20 PROCEDURE — 6370000000 HC RX 637 (ALT 250 FOR IP): Performed by: FAMILY MEDICINE

## 2019-04-20 PROCEDURE — 1200000000 HC SEMI PRIVATE

## 2019-04-20 RX ADMIN — HYDROCHLOROTHIAZIDE 25 MG: 25 TABLET ORAL at 18:36

## 2019-04-20 RX ADMIN — ACETAMINOPHEN 650 MG: 325 TABLET, FILM COATED ORAL at 00:42

## 2019-04-20 RX ADMIN — Medication 10 ML: at 08:36

## 2019-04-20 RX ADMIN — ROSUVASTATIN CALCIUM 20 MG: 20 TABLET, FILM COATED ORAL at 20:45

## 2019-04-20 RX ADMIN — ASPIRIN 81 MG 81 MG: 81 TABLET ORAL at 08:36

## 2019-04-20 RX ADMIN — BENZOCAINE, MENTHOL 1 LOZENGE: 15; 3.6 LOZENGE ORAL at 20:49

## 2019-04-20 RX ADMIN — BENZOCAINE, MENTHOL 1 LOZENGE: 15; 3.6 LOZENGE ORAL at 18:36

## 2019-04-20 RX ADMIN — LISINOPRIL 40 MG: 10 TABLET ORAL at 08:36

## 2019-04-20 RX ADMIN — ENOXAPARIN SODIUM 40 MG: 40 INJECTION SUBCUTANEOUS at 08:35

## 2019-04-20 ASSESSMENT — PAIN DESCRIPTION - LOCATION: LOCATION: CHEST

## 2019-04-20 ASSESSMENT — PAIN DESCRIPTION - PAIN TYPE: TYPE: ACUTE PAIN

## 2019-04-20 ASSESSMENT — PAIN DESCRIPTION - ORIENTATION: ORIENTATION: MID

## 2019-04-20 ASSESSMENT — PAIN DESCRIPTION - FREQUENCY: FREQUENCY: INTERMITTENT

## 2019-04-20 ASSESSMENT — PAIN SCALES - GENERAL
PAINLEVEL_OUTOF10: 7
PAINLEVEL_OUTOF10: 2

## 2019-04-20 ASSESSMENT — PAIN DESCRIPTION - DIRECTION: RADIATING_TOWARDS: BACK

## 2019-04-20 ASSESSMENT — PAIN DESCRIPTION - DESCRIPTORS: DESCRIPTORS: DISCOMFORT

## 2019-04-20 NOTE — PROGRESS NOTES
deficits  Psych: Normal affect. Alert and oriented in time, place and person  Skin: Warm, dry, normal turgor    Labs and Tests:  CBC:   Recent Labs     04/19/19  1614   WBC 9.9   HGB 16.2*        BMP:  Recent Labs     04/19/19  1614      K 3.6      CO2 25   BUN 15   CREATININE 0.7   GLUCOSE 121*     Hepatic: Recent Labs     04/19/19  1614   AST 37   ALT 37   BILITOT 0.5   ALKPHOS 65             Problem List  Active Problems:    Unstable angina (HCC)  Resolved Problems:    * No resolved hospital problems. *       Assessment & Plan:   1. Unstable angina with CAD:  She has hx of multivessel disease with previous CABG and subsequent occlusions. For angiogram on Monday   2. HTN:  BP in range on ace and hctz  3. HLD: on statin  4. Hx of ITP:  Follows with hematology.   Will follow her counts       Diet: DIET CARDIAC;  Code:Full Code  DVT PPX      JAMES Russo - CNP   4/20/2019 7:43 AM

## 2019-04-20 NOTE — PROGRESS NOTES
[address]   Daily Progress Note      Admit Date:  4/19/2019      Subjective:Minimal chest pain last night, but now with headache from NTG patch, \"but not bad\"    HPI:   Ms. Garcia  Reports new onset cp last two weeks. CP substernal, pressure, radiating to arms, worse with exertion, better with rest, crescendo in severity and frequency. Hx of cabg X3 with failed graft X2. EKG in office without acute changes, was scheduled for cath on Monday as OP but came to hospital due to chest pain    The patient was seen and examined. Notes, labs and recent testing reviewed. There were not complications over night. The patient is being seen for CAD, chest pain      Objective:     /64   Pulse 66   Temp 97.6 °F (36.4 °C) (Temporal)   Resp 15   Ht 5' 6\" (1.676 m)   Wt 174 lb 1.6 oz (79 kg)   SpO2 92%   BMI 28.10 kg/m²    No intake or output data in the 24 hours ending 04/20/19 4077    Physical Exam:  General:  Awake, alert, NAD  Skin:  Warm and dry  Neck:  JVD<8, no bruit  Chest:  Clear to auscultation, no wheezes/rhonchi/rales  Cardiovascular:  RRR S1S2  Abdomen:  Soft, nontender, +bowel sounds  Extremities:  no edema    Medications:    hydrochlorothiazide  25 mg Oral Daily    lisinopril  40 mg Oral Daily    rosuvastatin  20 mg Oral QPM    sodium chloride flush  10 mL Intravenous 2 times per day    enoxaparin  40 mg Subcutaneous Daily    aspirin  81 mg Oral Daily       sodium chloride flush, magnesium hydroxide, ondansetron, acetaminophen, nitroGLYCERIN, morphine    Lab Data:  CBC:   Recent Labs     04/19/19  1614   WBC 9.9   HGB 16.2*        BMP:    Recent Labs     04/19/19  1614      K 3.6   CO2 25   BUN 15   CREATININE 0.7     LIVR:   Recent Labs     04/19/19  1614   AST 37   ALT 37     PT/INR:   Recent Labs     04/19/19  1614   PROT 7.4   INR 0.92     APTT:   Recent Labs     04/19/19  1614   APTT 31.2     BNP:  No results for input(s): BNP in the last 72 hours.   CARDIAC ENZYMES:  Recent Labs     19  1614 19  1830 196   TROPONINI <0.01 <0.01 <0.01     FASTING LIPID PANEL:  Lab Results   Component Value Date    CHOL 142 2017    HDL 53 2017    TRIG 165 2017        MedHx:            has a past medical history of CAD (coronary artery disease), Disease of blood and blood forming organ, Hyperlipidemia, and Hypertension. SurgHx:           has a past surgical history that includes Appendectomy; Knee arthroscopy (Left, ); Carpal tunnel release (Bilateral, );  section; Tonsillectomy; Coronary artery bypass graft (14 emergent); Splenectomy (12/15); Cardiac surgery; and Coronary angioplasty (2017). SocHx:                         reports that she quit smoking about 23 years ago. She started smoking about 42 years ago. She has a 40.00 pack-year smoking history. She has never used smokeless tobacco. She reports that she drinks alcohol. She reports that she does not use drugs. FamHx:           family history includes High Blood Pressure in her mother. 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

## 2019-04-20 NOTE — PLAN OF CARE
Problem: Pain:  Goal: Control of acute pain  Description  Control of acute pain  Note:   Shift assessment completed, pt A&OX4, /73   Pulse 61   Temp 97 °F (36.1 °C)   Resp 16   Ht 5' 6\" (1.676 m)   Wt 174 lb 1.6 oz (79 kg)   SpO2 94%   BMI 28.10 kg/m²   SR on tele, pt reports twinges of mild chest pain at times, nothing intense or constant. Reminded to notify nurse of any increase in chest pain.

## 2019-04-20 NOTE — H&P
HOSPITALISTS HISTORY AND PHYSICAL    2019 10:18 PM    Patient Information:  Pavan Pino is a 60 y.o. female [id number]  PCP:  Judge Bates (Tel: [phone] )    Chief complaint:    Chief Complaint   Patient presents with    Chest Pain     SENT UP FROM DR. Pat Salter OFFICE FOR CP PAST TWO WEEK. WORSE TODAY. History of Present Illness:  Sandi Mena is a 60 y.o. female with cAD s/p CABG, HTN was sent from cardiology office d/t chest pain . The pt has been c/o exertional left sided chest pain on going for couple of week. The sx are associated with dyspnea . Pain radiates to arm . HEr sx resolved with nitro . She is c/o headache now. EKG showed no acute changes . Troponin is normal so far. REVIEW OF SYSTEMS:   Constitutional: Negative for fever,chills or night sweats  ENT: Negative for rhinorrhea, epistaxis, hoarseness, sore throat. Respiratory: Negative for shortness of breath,wheezing  Cardiovascular: +Ve for chest pain, palpitations   Gastrointestinal: Negative for nausea, vomiting, diarrhea  Genitourinary: Negative for polyuria, dysuria   Hematologic/Lymphatic: Negative for bleeding tendency, easy bruising  Musculoskeletal: Negative for myalgias and arthralgias  Neurologic: Negative for confusion,dysarthria. Skin: Negative for itching,rash  Psychiatric: Negative for depression,anxiety, agitation. Endocrine: Negative for polydipsia,polyuria,heat /cold intolerance. Past Medical History:   has a past medical history of CAD (coronary artery disease), Disease of blood and blood forming organ, Hyperlipidemia, and Hypertension. Past Surgical History:   has a past surgical history that includes Appendectomy; Knee arthroscopy (Left, ); Carpal tunnel release (Bilateral, );  section; Tonsillectomy; Coronary artery bypass graft (14 emergent); Splenectomy (12/15);  Cardiac surgery; and Coronary angioplasty (08/2017). Medications:  No current facility-administered medications on file prior to encounter. Current Outpatient Medications on File Prior to Encounter   Medication Sig Dispense Refill    rosuvastatin (CRESTOR) 20 MG tablet Take 1 tablet by mouth every evening 90 tablet 3    hydrochlorothiazide (HYDRODIURIL) 25 MG tablet Take 1 tablet by mouth daily 90 tablet 3    lisinopril (PRINIVIL;ZESTRIL) 40 MG tablet Take 1 tablet by mouth daily 90 tablet 3    SM ASPIRIN ADULT LOW STRENGTH 81 MG EC tablet TAKE 1 TABLET BY MOUTH ONE TIME A DAY  30 tablet 0    nitroGLYCERIN (NITROSTAT) 0.4 MG SL tablet Place 1 tablet under the tongue every 5 minutes as needed for Chest pain up to max of 3 total doses.  If no relief after 1 dose, call 911. 25 tablet 3     Current Facility-Administered Medications   Medication Dose Route Frequency Provider Last Rate Last Dose    hydrochlorothiazide (HYDRODIURIL) tablet 25 mg  25 mg Oral Daily Anita Tapia MD   25 mg at 04/19/19 1841    lisinopril (PRINIVIL;ZESTRIL) tablet 40 mg  40 mg Oral Daily Anita Tapia MD        rosuvastatin (CRESTOR) tablet 20 mg  20 mg Oral QPM Anita Tapia MD   20 mg at 04/19/19 2113    sodium chloride flush 0.9 % injection 10 mL  10 mL Intravenous 2 times per day Anita Tapia MD   10 mL at 04/19/19 2113    sodium chloride flush 0.9 % injection 10 mL  10 mL Intravenous PRN Anita Tapia MD        magnesium hydroxide (MILK OF MAGNESIA) 400 MG/5ML suspension 30 mL  30 mL Oral Daily PRN Anita Tapia MD        ondansetron [facility]) injection 4 mg  4 mg Intravenous Q6H PRN Anita Tapia MD        enoxaparin (LOVENOX) injection 40 mg  40 mg Subcutaneous Daily Rocío Mccoy MD   40 mg at 04/19/19 1841    acetaminophen (TYLENOL) tablet 650 mg  650 mg Oral Q4H PRN Anita Tapia MD        aspirin chewable tablet 81 mg  81 mg Oral Daily Anita Tapia MD        nitroGLYCERIN (NITROSTAT) SL tablet 0.4 mg  0.4 mg Sublingual Q5 Min PRN Anita Tapia MD  morphine (PF) injection 2 mg  2 mg Intravenous Q4H PRN Evaristo Gomez MD           Allergies: Allergies   Allergen Reactions    Eggs Or Egg-Derived Products [Eggs Or Egg-Derived Products]     Imdur [Isosorbide Dinitrate]      HAs    Poultry Meal Other (See Comments)        Social History:   reports that she quit smoking about 23 years ago. She started smoking about 42 years ago. She has a 40.00 pack-year smoking history. She has never used smokeless tobacco. She reports that she drinks alcohol. She reports that she does not use drugs. Family History:  family history includes High Blood Pressure in her mother. ,     Physical Exam:  /72   Pulse 64   Temp 98.1 °F (36.7 °C) (Oral)   Resp 16   Ht 5' 6\" (1.676 m)   Wt 173 lb 9.6 oz (78.7 kg)   SpO2 94%   BMI 28.02 kg/m²     General appearance:  Appears comfortable. Well nourished  Eyes: Sclera clear, pupils equal  ENT: Moist mucus membranes, no thrush. Trachea midline. Cardiovascular: Regular rhythm, normal S1, S2. No murmur, gallop, rub. No edema in lower extremities  Respiratory: Clear to auscultation bilaterally, no wheeze, good inspiratory effort  Gastrointestinal: Abdomen soft, non-tender, not distended, normal bowel sounds  Musculoskeletal: No cyanosis in digits, neck supple  Neurology: Cranial nerves grossly intact. Alert and oriented in time, place and person. No speech or motor deficits  Psychiatry: Appropriate affect.  Not agitated  Skin: Warm, dry, normal turgor, no rash    Labs:  CBC:   Lab Results   Component Value Date    WBC 9.9 04/19/2019    RBC 5.05 04/19/2019    HGB 16.2 04/19/2019    HCT 48.2 04/19/2019    MCV 95.3 04/19/2019    MCH 32.1 04/19/2019    MCHC 33.7 04/19/2019    RDW 14.1 04/19/2019     04/19/2019    MPV 10.4 04/19/2019     BMP:    Lab Results   Component Value Date     04/19/2019    K 3.6 04/19/2019     04/19/2019    CO2 25 04/19/2019    BUN 15 04/19/2019    CREATININE 0.7 04/19/2019    CALCIUM 9.4 04/19/2019    GFRAA >60 04/19/2019    LABGLOM >60 04/19/2019    GLUCOSE 121 04/19/2019       Chest Xray:   EKG:        Problem List  Active Problems:    Unstable angina ([address]  Resolved Problems:    * No resolved hospital problems. *        Assessment/Plan:         1. Unstable angina  Cardiac diet  Cont ASA and nitro  Will check serial troponin  Cardiology consulted  Plan for angiogram on Monday    HTN controlled on HCTz, ACei,    CAD s/p CABG cont crestor, ASA, ACEi  Admit as inpt. I anticipate hospitalization spanning more than two midnights for investigation and treatment of the above medically necessary diagnoses.       Danie Rascon MD    4/19/2019 10:18 PM

## 2019-04-21 PROCEDURE — 6370000000 HC RX 637 (ALT 250 FOR IP): Performed by: FAMILY MEDICINE

## 2019-04-21 PROCEDURE — 6360000002 HC RX W HCPCS: Performed by: FAMILY MEDICINE

## 2019-04-21 PROCEDURE — 2580000003 HC RX 258: Performed by: FAMILY MEDICINE

## 2019-04-21 PROCEDURE — 1200000000 HC SEMI PRIVATE

## 2019-04-21 PROCEDURE — 99232 SBSQ HOSP IP/OBS MODERATE 35: CPT | Performed by: NURSE PRACTITIONER

## 2019-04-21 RX ADMIN — Medication 10 ML: at 08:21

## 2019-04-21 RX ADMIN — ROSUVASTATIN CALCIUM 20 MG: 20 TABLET, FILM COATED ORAL at 20:43

## 2019-04-21 RX ADMIN — LISINOPRIL 40 MG: 10 TABLET ORAL at 08:20

## 2019-04-21 RX ADMIN — Medication 10 ML: at 00:01

## 2019-04-21 RX ADMIN — ACETAMINOPHEN 650 MG: 325 TABLET, FILM COATED ORAL at 11:32

## 2019-04-21 RX ADMIN — ENOXAPARIN SODIUM 40 MG: 40 INJECTION SUBCUTANEOUS at 08:21

## 2019-04-21 RX ADMIN — HYDROCHLOROTHIAZIDE 25 MG: 25 TABLET ORAL at 16:30

## 2019-04-21 RX ADMIN — Medication 10 ML: at 20:43

## 2019-04-21 RX ADMIN — ASPIRIN 81 MG 81 MG: 81 TABLET ORAL at 08:20

## 2019-04-21 ASSESSMENT — PAIN SCALES - GENERAL: PAINLEVEL_OUTOF10: 4

## 2019-04-21 NOTE — PROGRESS NOTES
PROT 7.4   INR 0.92     APTT:   Recent Labs     19  1614   APTT 31.2     BNP:  No results for input(s): BNP in the last 72 hours. CARDIAC ENZYMES:  Recent Labs     19  1614 19  1830 19   TROPONINI <0.01 <0.01 <0.01     FASTING LIPID PANEL:  Lab Results   Component Value Date    CHOL 142 2017    HDL 53 2017    TRIG 165 2017        MedHx:            has a past medical history of CAD (coronary artery disease), Disease of blood and blood forming organ, Hyperlipidemia, and Hypertension. SurgHx:           has a past surgical history that includes Appendectomy; Knee arthroscopy (Left, ); Carpal tunnel release (Bilateral, );  section; Tonsillectomy; Coronary artery bypass graft (14 emergent); Splenectomy (12/15); Cardiac surgery; and Coronary angioplasty (2017). SocHx:                         reports that she quit smoking about 23 years ago. She started smoking about 42 years ago. She has a 40.00 pack-year smoking history. She has never used smokeless tobacco. She reports that she drinks alcohol. She reports that she does not use drugs. FamHx:           family history includes High Blood Pressure in her mother. 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

## 2019-04-21 NOTE — PROGRESS NOTES
[facility]    4/21/2019 7:40 AM        Name: Garcia Lal . Admitted: 4/19/2019  Primary Care Provider: Alis King (Tel: [phone])      Subjective:  . Watching TV with her friend   No current reports of pain or dyspnea   Reported some hand tingling earlier while walking in the halls     Reviewed interval ancillary notes    Current Medications    benzocaine-menthol (CEPACOL SORE THROAT) lozenge 1 lozenge Q2H PRN   hydrochlorothiazide (HYDRODIURIL) tablet 25 mg Daily   lisinopril (PRINIVIL;ZESTRIL) tablet 40 mg Daily   rosuvastatin (CRESTOR) tablet 20 mg QPM   sodium chloride flush 0.9 % injection 10 mL 2 times per day   sodium chloride flush 0.9 % injection 10 mL PRN   magnesium hydroxide (MILK OF MAGNESIA) 400 MG/5ML suspension 30 mL Daily PRN   ondansetron (ZOFRAN) injection 4 mg Q6H PRN   enoxaparin (LOVENOX) injection 40 mg Daily   acetaminophen (TYLENOL) tablet 650 mg Q4H PRN   aspirin chewable tablet 81 mg Daily   nitroGLYCERIN (NITROSTAT) SL tablet 0.4 mg Q5 Min PRN   morphine (PF) injection 2 mg Q4H PRN       Objective:  /71   Pulse 71   Temp 98.1 °F (36.7 °C) (Temporal)   Resp 15   Ht 5' 6\" (1.676 m)   Wt 174 lb 12.8 oz (79.3 kg)   SpO2 91%   BMI 28.21 kg/m²     Intake/Output Summary (Last 24 hours) at 4/21/2019 0740  Last data filed at 4/20/2019 1600  Gross per 24 hour   Intake 480 ml   Output --   Net 480 ml      Wt Readings from Last 3 Encounters:   04/21/19 174 lb 12.8 oz (79.3 kg)   04/19/19 175 lb (79.4 kg)   09/17/18 188 lb (85.3 kg)       General appearance:  Appears comfortable, alert and pleasant   Eyes: Sclera clear. Pupils equal.  ENT: Moist oral mucosa. Trachea midline, no adenopathy. Cardiovascular: Regular rhythm, normal S1, S2. No murmur. No edema in lower extremities  Respiratory: Not using accessory muscles. Good inspiratory effort.  Clear to auscultation bilaterally, no wheeze or crackles. GI: Abdomen soft, no tenderness, not distended, normal bowel sounds  Musculoskeletal: No cyanosis in digits, neck supple  Neurology: CN 2-12 grossly intact. No speech or motor deficits  Psych: Normal affect. Alert and oriented in time, place and person  Skin: Warm, dry, normal turgor    Labs and Tests:  CBC:   Recent Labs     04/19/19  1614   WBC 9.9   HGB 16.2*        BMP:    Recent Labs     04/19/19  1614      K 3.6      CO2 25   BUN 15   CREATININE 0.7   GLUCOSE 121*     Hepatic:   Recent Labs     04/19/19  1614   AST 37   ALT 37   BILITOT 0.5   ALKPHOS 65             Problem List  Active Problems:    Unstable angina (HCC)  Resolved Problems:    * No resolved hospital problems. *       Assessment & Plan:   1. Unstable angina with CAD:  She has hx of multivessel disease with previous CABG and subsequent occlusions. For angiogram on Monday   2. HTN:  BP in range on ace and hctz  3. HLD: on statin  4.  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

## 2019-04-21 NOTE — PROGRESS NOTES
Assessment completed. Please see flowsheets. Pt resting quietly in bed. Rating 2 out of 10 chest pain. No further needs stated at this time. Call light within reach.  Will continue to monitor

## 2019-04-22 ENCOUNTER — PATIENT MESSAGE (OUTPATIENT)
Dept: CARDIOLOGY CLINIC | Age: 59
End: 2019-04-22

## 2019-04-22 PROBLEM — Z95.5 STATUS POST INSERTION OF DRUG-ELUTING STENT INTO LEFT ANTERIOR DESCENDING (LAD) ARTERY FOR CORONARY ARTERY DISEASE: Status: ACTIVE | Noted: 2019-04-22

## 2019-04-22 PROBLEM — I24.9 ACS (ACUTE CORONARY SYNDROME) (HCC): Status: ACTIVE | Noted: 2019-04-22

## 2019-04-22 PROBLEM — I20.0 UNSTABLE ANGINA (HCC): Status: RESOLVED | Noted: 2017-08-15 | Resolved: 2019-04-22

## 2019-04-22 LAB
EKG ATRIAL RATE: 46 BPM
EKG DIAGNOSIS: NORMAL
EKG P AXIS: 43 DEGREES
EKG P-R INTERVAL: 180 MS
EKG Q-T INTERVAL: 424 MS
EKG QRS DURATION: 92 MS
EKG QTC CALCULATION (BAZETT): 371 MS
EKG R AXIS: 9 DEGREES
EKG T AXIS: 9 DEGREES
EKG VENTRICULAR RATE: 46 BPM
HCT VFR BLD CALC: 46.9 % (ref 36–48)
HEMOGLOBIN: 15.7 G/DL (ref 12–16)
MCH RBC QN AUTO: 32.3 PG (ref 26–34)
MCHC RBC AUTO-ENTMCNC: 33.4 G/DL (ref 31–36)
MCV RBC AUTO: 96.5 FL (ref 80–100)
PDW BLD-RTO: 14.2 % (ref 12.4–15.4)
PLATELET # BLD: 210 K/UL (ref 135–450)
PMV BLD AUTO: 10.5 FL (ref 5–10.5)
POC ACT LR: 236 SEC
POC ACT LR: 249 SEC
RBC # BLD: 4.87 M/UL (ref 4–5.2)
WBC # BLD: 9.4 K/UL (ref 4–11)

## 2019-04-22 PROCEDURE — 6360000002 HC RX W HCPCS

## 2019-04-22 PROCEDURE — 93005 ELECTROCARDIOGRAM TRACING: CPT | Performed by: INTERNAL MEDICINE

## 2019-04-22 PROCEDURE — B2131ZZ FLUOROSCOPY OF MULTIPLE CORONARY ARTERY BYPASS GRAFTS USING LOW OSMOLAR CONTRAST: ICD-10-PCS | Performed by: INTERNAL MEDICINE

## 2019-04-22 PROCEDURE — 6360000002 HC RX W HCPCS: Performed by: FAMILY MEDICINE

## 2019-04-22 PROCEDURE — C1725 CATH, TRANSLUMIN NON-LASER: HCPCS

## 2019-04-22 PROCEDURE — 6370000000 HC RX 637 (ALT 250 FOR IP): Performed by: FAMILY MEDICINE

## 2019-04-22 PROCEDURE — 2580000003 HC RX 258: Performed by: FAMILY MEDICINE

## 2019-04-22 PROCEDURE — C1769 GUIDE WIRE: HCPCS

## 2019-04-22 PROCEDURE — 93459 L HRT ART/GRFT ANGIO: CPT

## 2019-04-22 PROCEDURE — 92928 PRQ TCAT PLMT NTRAC ST 1 LES: CPT | Performed by: INTERNAL MEDICINE

## 2019-04-22 PROCEDURE — 6370000000 HC RX 637 (ALT 250 FOR IP): Performed by: INTERNAL MEDICINE

## 2019-04-22 PROCEDURE — 6370000000 HC RX 637 (ALT 250 FOR IP)

## 2019-04-22 PROCEDURE — 2500000003 HC RX 250 WO HCPCS

## 2019-04-22 PROCEDURE — C1894 INTRO/SHEATH, NON-LASER: HCPCS

## 2019-04-22 PROCEDURE — 2709999900 HC NON-CHARGEABLE SUPPLY

## 2019-04-22 PROCEDURE — 1200000000 HC SEMI PRIVATE

## 2019-04-22 PROCEDURE — 99152 MOD SED SAME PHYS/QHP 5/>YRS: CPT

## 2019-04-22 PROCEDURE — 85347 COAGULATION TIME ACTIVATED: CPT

## 2019-04-22 PROCEDURE — B2111ZZ FLUOROSCOPY OF MULTIPLE CORONARY ARTERIES USING LOW OSMOLAR CONTRAST: ICD-10-PCS | Performed by: INTERNAL MEDICINE

## 2019-04-22 PROCEDURE — 85027 COMPLETE CBC AUTOMATED: CPT

## 2019-04-22 PROCEDURE — 027034Z DILATION OF CORONARY ARTERY, ONE ARTERY WITH DRUG-ELUTING INTRALUMINAL DEVICE, PERCUTANEOUS APPROACH: ICD-10-PCS | Performed by: INTERNAL MEDICINE

## 2019-04-22 PROCEDURE — 93010 ELECTROCARDIOGRAM REPORT: CPT | Performed by: INTERNAL MEDICINE

## 2019-04-22 PROCEDURE — 4A023N7 MEASUREMENT OF CARDIAC SAMPLING AND PRESSURE, LEFT HEART, PERCUTANEOUS APPROACH: ICD-10-PCS | Performed by: INTERNAL MEDICINE

## 2019-04-22 PROCEDURE — 99153 MOD SED SAME PHYS/QHP EA: CPT

## 2019-04-22 PROCEDURE — 6360000004 HC RX CONTRAST MEDICATION: Performed by: INTERNAL MEDICINE

## 2019-04-22 PROCEDURE — 92928 PRQ TCAT PLMT NTRAC ST 1 LES: CPT

## 2019-04-22 PROCEDURE — 93458 L HRT ARTERY/VENTRICLE ANGIO: CPT | Performed by: INTERNAL MEDICINE

## 2019-04-22 PROCEDURE — C1874 STENT, COATED/COV W/DEL SYS: HCPCS

## 2019-04-22 RX ADMIN — LISINOPRIL 40 MG: 10 TABLET ORAL at 13:03

## 2019-04-22 RX ADMIN — TICAGRELOR 90 MG: 90 TABLET ORAL at 20:49

## 2019-04-22 RX ADMIN — ROSUVASTATIN CALCIUM 20 MG: 20 TABLET, FILM COATED ORAL at 20:49

## 2019-04-22 RX ADMIN — HYDROCHLOROTHIAZIDE 25 MG: 25 TABLET ORAL at 13:03

## 2019-04-22 RX ADMIN — Medication 10 ML: at 20:49

## 2019-04-22 RX ADMIN — ASPIRIN 81 MG 81 MG: 81 TABLET ORAL at 08:29

## 2019-04-22 RX ADMIN — IOPAMIDOL 126 ML: 755 INJECTION, SOLUTION INTRAVENOUS at 08:59

## 2019-04-22 RX ADMIN — Medication 10 ML: at 10:05

## 2019-04-22 RX ADMIN — ONDANSETRON 4 MG: 2 INJECTION INTRAMUSCULAR; INTRAVENOUS at 13:51

## 2019-04-22 RX ADMIN — ACETAMINOPHEN 650 MG: 325 TABLET, FILM COATED ORAL at 14:02

## 2019-04-22 ASSESSMENT — PAIN DESCRIPTION - PAIN TYPE
TYPE: ACUTE PAIN
TYPE: ACUTE PAIN

## 2019-04-22 ASSESSMENT — PAIN DESCRIPTION - ORIENTATION: ORIENTATION: LEFT

## 2019-04-22 ASSESSMENT — PAIN SCALES - GENERAL
PAINLEVEL_OUTOF10: 0
PAINLEVEL_OUTOF10: 3
PAINLEVEL_OUTOF10: 5
PAINLEVEL_OUTOF10: 0

## 2019-04-22 ASSESSMENT — PAIN DESCRIPTION - ONSET
ONSET: GRADUAL
ONSET: ON-GOING

## 2019-04-22 ASSESSMENT — PAIN DESCRIPTION - DESCRIPTORS
DESCRIPTORS: ACHING;STABBING
DESCRIPTORS: HEADACHE

## 2019-04-22 ASSESSMENT — PAIN DESCRIPTION - PROGRESSION
CLINICAL_PROGRESSION: GRADUALLY WORSENING
CLINICAL_PROGRESSION: GRADUALLY WORSENING

## 2019-04-22 ASSESSMENT — PAIN - FUNCTIONAL ASSESSMENT
PAIN_FUNCTIONAL_ASSESSMENT: PREVENTS OR INTERFERES WITH MANY ACTIVE NOT PASSIVE ACTIVITIES
PAIN_FUNCTIONAL_ASSESSMENT: PREVENTS OR INTERFERES WITH MANY ACTIVE NOT PASSIVE ACTIVITIES

## 2019-04-22 ASSESSMENT — PAIN DESCRIPTION - LOCATION
LOCATION: HEAD
LOCATION: BACK

## 2019-04-22 ASSESSMENT — PAIN DESCRIPTION - FREQUENCY
FREQUENCY: CONTINUOUS
FREQUENCY: CONTINUOUS

## 2019-04-22 NOTE — TELEPHONE ENCOUNTER
From: Kandace Marks  To: Vicky Latham MD  Sent: 4/16/2019 8:16 AM EDT  Subject: Alcohol & Tobacco Use    History questionnaire submitted on Tuesday April 16, 2019 at 8:16:09 AM  Questionnaire: Alcohol & Tobacco Use  Patient: Sloan Liu [[id number]]      Social History:    Question: Alcohol Use  Response: Yes  Drinks/Week: 0 Glasses of wine, 2 Cans of beer, 0   Shots of liquor  Comments:     Question: Tobacco Use  Response:  Former Smoker  Types:   Packs/day: 2  Years: 21  Start Date: 1/1/1977  Quit Date: 2/1/1996  Comments:

## 2019-04-22 NOTE — PROGRESS NOTES
Educated patient and/or family on the importance of attending Cardiac Rehab post procedure. Educational flyer provided.

## 2019-04-22 NOTE — PROGRESS NOTES
sounds  Musculoskeletal: No cyanosis in digits, neck supple  Neurology: CN 2-12 grossly intact. No speech or motor deficits  Psych: Normal affect. Alert and oriented in time, place and person  Skin: Warm, dry, normal turgor    Labs and Tests:  CBC:   Recent Labs     04/19/19  1614   WBC 9.9   HGB 16.2*        BMP:    Recent Labs     04/19/19  1614      K 3.6      CO2 25   BUN 15   CREATININE 0.7   GLUCOSE 121*     Hepatic:   Recent Labs     04/19/19  1614   AST 37   ALT 40   BILITOT 0.5   ALKPHOS 72     Cath results  Access:          LRA (MD >2mm)  Findings:                      LM       Normal              LAD     Mid 80% and hazy              Cx        OM              RCA     20% mid              LVG     Not performed              EDP     10 mmHg              L-LAD  Atretic, poor flow to distal LAD              S-D      Patent              S-OM   Occluded ostially  Severe Ca++:None  Post Cath Dx:Severe LAD disease  Intervention:  PCI of LAD with 2.75X23 Xience MAC  Med Rec:          Problem List  Principal Problem:    ACS (acute coronary syndrome) ([facility] [address] [facility]  Active Problems:    Essential hypertension    Acute chest pain    Status post insertion of drug-eluting stent into left anterior descending (LAD) artery for coronary artery disease  Resolved Problems:    Unstable angina ([facility] [address]       Assessment & Plan:   1. Unstable angina secondary to CAD:  Had stenting of LAD today. Management per cardiology. 2. Nausea-zofran prn.   3. HTN:  BP in range on ace and hctz  4. HLD: on statin  5.  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

## 2019-04-22 NOTE — PROGRESS NOTES
Pt requested to receive communion on Sunday, 4/21. Pt and family received communion from [employer] as requested.

## 2019-04-22 NOTE — CARE COORDINATION
Discharge Planning Assessment  RN discharge planner met with patient and family member to discuss reason for admission, current living situation, and potential needs at the time of discharge    Demographics/Insurance verified Yes [facility]    Current type of dwelling: Private Residence an apartment that is one level. There are not any steps to enter the residence. Living arrangements: Patient reports that she lives alone. Level of function/Support: Patient reports that she is independent with ADLs. PCP: Dr. Hernesto Westbrook    Last Visit to PCP: about 6 months ago per the patient    DME: None     Active with any community resources/agencies/skilled home care:None identified    Medication compliance issues: Patient denies    Financial issues that could impact healthcare: Patient denies    Transportation at the time of discharge: Personal vehicle    Tentative discharge plan: Home, no needs identified.

## 2019-04-22 NOTE — PRE SEDATION
Brief Pre-Op Note/Sedation Assessment      Zenon Galvan  1960  [id number]  [id number]  6:56 AM    Planned Procedure: Cardiac Catheterization Procedure    Post Procedure Plan: Return to same level of care    Consent: I have discussed with the patient and/or the patient representative the indication, alternatives, and the possible risks and/or complications of the planned procedure and the anesthesia methods. The patient and/or patient representative appear to understand and agree to proceed. Chief Complaint: Chest Pain/Pressure  Dyspnea on Exertion      Indications for Cath Procedure:  New Onset Angina <= 2 months and Worsening Angina  Anginal Classification within 2 weeks:  CCS III - Symptoms with everyday living activities, i.e., moderate limitation  NYHA Heart Failure Class within 2 weeks: No symptoms    Anti- Anginal Meds within 2 weeks:   Yes: Aspirin and Statin (Any)    Stress or Imaging Studies Performed:  None    Vital Signs:  /65   Pulse 62   Temp 96.3 °F (35.7 °C) (Temporal)   Resp 17   Ht 5' 6\" (1.676 m)   Wt 174 lb 12.8 oz (79.3 kg)   SpO2 95%   BMI 28.21 kg/m²     Allergies: Allergies   Allergen Reactions    Eggs Or Egg-Derived Products [Eggs Or Egg-Derived Products]     Imdur [Isosorbide Dinitrate]      HAs    Poultry Meal Other (See Comments)       Past Medical History:  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    Hyperlipidemia     Hypertension          Surgical History:  Past Surgical History:   Procedure Laterality Date    APPENDECTOMY      CARDIAC SURGERY      14 CABG x 3    CARPAL TUNNEL RELEASE Bilateral 2006     SECTION      CORONARY ANGIOPLASTY  2017    CORONARY ARTERY BYPASS GRAFT  14 emergent    CABGx3 R-SVG/LIMA    KNEE ARTHROSCOPY Left     SPLENECTOMY  12/15    B. Jonh    TONSILLECTOMY           Medications:  Current Facility-Administered Medications   Medication Dose Route Frequency Provider Last Rate Last Dose    benzocaine-menthol (CEPACOL SORE THROAT) lozenge 1 lozenge  1 lozenge Oral Q2H PRN Laura Mcdonald MD   1 lozenge at 04/20/19 2049    hydrochlorothiazide (HYDRODIURIL) tablet 25 mg  25 mg Oral Daily Rocío Mccoy MD   25 mg at 04/21/19 1630    lisinopril (PRINIVIL;ZESTRIL) tablet 40 mg  40 mg Oral Daily Victoria Howell MD   40 mg at 04/21/19 0820    rosuvastatin (CRESTOR) tablet 20 mg  20 mg Oral QPM Victoria Howell MD   20 mg at 04/21/19 2043    sodium chloride flush 0.9 % injection 10 mL  10 mL Intravenous 2 times per day Victoria Howell MD   10 mL at 04/21/19 2043    sodium chloride flush 0.9 % injection 10 mL  10 mL Intravenous PRN Victoria Howell MD        magnesium hydroxide (MILK OF MAGNESIA) 400 MG/5ML suspension 30 mL  30 mL Oral Daily PRN Victoria Howell MD        ondansetron [facility]) injection 4 mg  4 mg Intravenous Q6H PRN Victoria Howell MD        enoxaparin (LOVENOX) injection 40 mg  40 mg Subcutaneous Daily Victoria Howell MD   40 mg at 04/21/19 4613    acetaminophen (TYLENOL) tablet 650 mg  650 mg Oral Q4H PRN Victoria Howell MD   650 mg at 04/21/19 1132    aspirin chewable tablet 81 mg  81 mg Oral Daily Rocío Mccoy MD   81 mg at 04/21/19 0820    nitroGLYCERIN (NITROSTAT) SL tablet 0.4 mg  0.4 mg Sublingual Q5 Min PRN Victoria Howell MD        morphine (PF) injection 2 mg  2 mg Intravenous Q4H PRN Victoria Howell MD               Pre-Sedation:    Pre-Sedation Documentation and Exam:  I have assessed the patient and agree with the H&P present on the chart. Prior History of Anesthesia Complications:   none    Modified Mallampati:  II (soft palate, uvula, fauces visible)    ASA Classification:  Class 2 - A normal healthy patient with mild systemic disease and Class 2 -- A normal healthy patient with mild systemic disease    Rosendo Scale:   Activity:  2 - Able to move 4 extremities voluntarily on

## 2019-04-22 NOTE — OP NOTE
[address]   Procedure Note      Procedure: [facility]  Indication: Unstable angina  Consent: Verbal and/or written consent obtained prior to procedure. Sedation: Minimal conscious sedation utilized for comfort. Complic: None  EBL:  <00OZ  Specimens: None  Fluoro: 13.1 min  Contrast: 126cc  Access: LRA (MD >2mm)  Findings:    LM Normal   LAD Mid 80% and hazy   Cx OM   RCA 20% mid   LVG Not performed   EDP 10 mmHg   L-LAD Atretic, poor flow to distal LAD   S-D Patent   S-OM Occluded ostially  Severe Ca++:None  Post Cath Dx:Severe LAD disease  Intervention: PCI of LAD with 2.75X23 Xience MAC  Med Rec:  Recommendation Indicated?  Not Given Due To:   Dual antiplatelet therapy for >1 year yes    High dose statin therapy yes    Beta blocker yes    ACE/ARB for depressed EF no no   Non-Med Rec:   Recommendation   Noninvasive assessment of EF if LVG deferred as IP/OP as appropriate   Avoidance of first and second hand smoke   Maintain healthy lifestyle with focus on diet, exercise and weight loss

## 2019-04-23 ENCOUNTER — OFFICE VISIT (OUTPATIENT)
Dept: CARDIOLOGY CLINIC | Age: 59
End: 2019-04-23

## 2019-04-23 VITALS
RESPIRATION RATE: 16 BRPM | BODY MASS INDEX: 28.09 KG/M2 | DIASTOLIC BLOOD PRESSURE: 68 MMHG | SYSTOLIC BLOOD PRESSURE: 138 MMHG | TEMPERATURE: 97.3 F | HEIGHT: 66 IN | WEIGHT: 174.8 LBS | HEART RATE: 59 BPM | OXYGEN SATURATION: 98 %

## 2019-04-23 DIAGNOSIS — I25.10 CORONARY ARTERY DISEASE INVOLVING NATIVE CORONARY ARTERY OF NATIVE HEART WITHOUT ANGINA PECTORIS: Primary | ICD-10-CM

## 2019-04-23 DIAGNOSIS — I10 ESSENTIAL HYPERTENSION: ICD-10-CM

## 2019-04-23 DIAGNOSIS — E78.00 HYPERCHOLESTEREMIA: ICD-10-CM

## 2019-04-23 LAB
EKG ATRIAL RATE: 62 BPM
EKG DIAGNOSIS: NORMAL
EKG P AXIS: 27 DEGREES
EKG P-R INTERVAL: 162 MS
EKG Q-T INTERVAL: 390 MS
EKG QRS DURATION: 90 MS
EKG QTC CALCULATION (BAZETT): 395 MS
EKG R AXIS: -3 DEGREES
EKG T AXIS: 42 DEGREES
EKG VENTRICULAR RATE: 62 BPM
HCT VFR BLD CALC: 46 % (ref 36–48)
HEMOGLOBIN: 15.9 G/DL (ref 12–16)
MCH RBC QN AUTO: 33.1 PG (ref 26–34)
MCHC RBC AUTO-ENTMCNC: 34.5 G/DL (ref 31–36)
MCV RBC AUTO: 95.9 FL (ref 80–100)
PDW BLD-RTO: 14.1 % (ref 12.4–15.4)
PLATELET # BLD: 205 K/UL (ref 135–450)
PMV BLD AUTO: 10.7 FL (ref 5–10.5)
RBC # BLD: 4.79 M/UL (ref 4–5.2)
WBC # BLD: 9.8 K/UL (ref 4–11)

## 2019-04-23 PROCEDURE — 2580000003 HC RX 258: Performed by: FAMILY MEDICINE

## 2019-04-23 PROCEDURE — 93010 ELECTROCARDIOGRAM REPORT: CPT | Performed by: INTERNAL MEDICINE

## 2019-04-23 PROCEDURE — 85027 COMPLETE CBC AUTOMATED: CPT

## 2019-04-23 PROCEDURE — 93005 ELECTROCARDIOGRAM TRACING: CPT | Performed by: NURSE PRACTITIONER

## 2019-04-23 PROCEDURE — 6370000000 HC RX 637 (ALT 250 FOR IP): Performed by: INTERNAL MEDICINE

## 2019-04-23 PROCEDURE — 99232 SBSQ HOSP IP/OBS MODERATE 35: CPT | Performed by: NURSE PRACTITIONER

## 2019-04-23 PROCEDURE — 6370000000 HC RX 637 (ALT 250 FOR IP): Performed by: FAMILY MEDICINE

## 2019-04-23 RX ORDER — CLOPIDOGREL BISULFATE 75 MG/1
75 TABLET ORAL DAILY
Qty: 30 TABLET | Refills: 1 | Status: SHIPPED | OUTPATIENT
Start: 2019-04-23 | End: 2019-05-08 | Stop reason: SINTOL

## 2019-04-23 RX ADMIN — TICAGRELOR 90 MG: 90 TABLET ORAL at 08:53

## 2019-04-23 RX ADMIN — HYDROCHLOROTHIAZIDE 25 MG: 25 TABLET ORAL at 08:53

## 2019-04-23 RX ADMIN — LISINOPRIL 40 MG: 10 TABLET ORAL at 08:53

## 2019-04-23 RX ADMIN — Medication 10 ML: at 08:54

## 2019-04-23 RX ADMIN — ASPIRIN 81 MG 81 MG: 81 TABLET ORAL at 08:53

## 2019-04-23 ASSESSMENT — PAIN SCALES - GENERAL
PAINLEVEL_OUTOF10: 0

## 2019-04-23 NOTE — PROGRESS NOTES
205     BMP:    FASTING LIPID PANEL:  Lab Results   Component Value Date    CHOL 142 08/16/2017    HDL 53 08/16/2017    TRIG 165 08/16/2017       Diagnostics:    Cardiac Angiogram/ PCI   Procedure:     [facility]  Indication:      Unstable angina  Consent:        Verbal and/or written consent obtained prior to procedure. Sedation:        Minimal conscious sedation utilized for comfort. Complic:         None  EBL:                <10cc  Specimens:    None  Fluoro:            13.1 min  Contrast:        126cc  Access:          LRA (MD >2mm)  Findings:                      LM       Normal              LAD     Mid 80% and hazy              Cx        OM              RCA     20% mid              LVG     Not performed              EDP     10 mmHg              L-LAD  Atretic, poor flow to distal LAD              S-D      Patent              S-OM   Occluded ostially  Severe Ca++:None  Post Cath Dx:Severe LAD disease  Intervention:  PCI of LAD with 2.75X23 Xience MAC  Med Rec:  Recommendation Indicated?  Not Given Due To:   Dual antiplatelet therapy for >1 year yes     High dose statin therapy yes     Beta blocker yes     ACE/ARB for depressed EF no no   Non-Med Rec:              Recommendation   Noninvasive assessment of EF if LVG deferred as IP/OP as appropriate   Avoidance of first and second hand smoke   Maintain healthy lifestyle with focus on diet, exercise and weight loss              Patient Active Problem List   Diagnosis    NSTEMI (non-ST elevated myocardial infarction) ([facility] [address] [facility]    Hx of CABG    CAD (coronary artery disease)    Ace's syndrome ([facility] [address] [facility]    Coronary artery disease involving native coronary artery of native heart without angina pectoris    Essential hypertension    Hypercholesteremia    Acute chest pain    Status post insertion of drug-eluting stent into left anterior descending (LAD) artery for coronary artery disease    ACS (acute coronary syndrome) ([facility]       Assessment/plan:   CAD:  Multivessel disease, 8/17 EF 55% SVG-OM occluded,   4/22/19 cardiac angiogram/ PCI of LAD  Plan: will repeat EKG - due to chest pain  Will wait on BB- will discuss at the follow up OV        HTN-controlled     Hyperlipidemia-crestor 20 mg daily         Ace's Syndrome  Platelet count - stable    followed by DR. Sudhir Lundberg hematologist             Dispo:    No change in EKG, ok for discharge, follow up in two weeks   Please see orders. Discussed with patient and nursing.       The patient is not on a beta-blocker  The patient is on an ace-i/ARB  The patient is on a statin  The patient is on antiplatelet therapy  The patient does not have history of AF, and is not on anticoagulation    Signed:  Neo López, [address]  [phone]

## 2019-04-23 NOTE — PLAN OF CARE
Pt alert and oriented. Pt is able to communicate the presence of pain and use the 0-10  pain scale appropriately. Nonpharmacological pain reducers and pain medication offered as needed. Will cont to monitor. Patient is afebrile and left radial puncture site is without redness, oozing, swelling or hematoma noted. Minimal bruising at site. No s/s of impaired coagulation observed. Checking WBC count this am but WBC count last night was WNL. Will continue to monitor. Patient has not reported any instances of angina this shift. VSS. Pulse rate and rhythm, peripheral pulses, and capillary refill assessed every shift with assessment. General color and body temperature monitored throughout shift and with vitals. Assess for edema with head to toe assessment. Administer treatments and medications as ordered. Monitor patient's weight.

## 2019-04-23 NOTE — DISCHARGE SUMMARY
[facility]    Patient Demographics    Patient. Ebony Guzman  Date of Birth. 1960  MRN. [id number]     Primary care provider. Dariela Caraballo  (Tel: [phone])    Admit date: 4/19/2019    Discharge date (blank if same as Note Date): 4/23/2019  Note Date: 4/23/2019     Reason for Hospitalization. Chief Complaint   Patient presents with    Chest Pain     SENT UP FROM DR. Mattie Cole OFFICE FOR CP PAST TWO WEEK. WORSE TODAY. Significant Findings. Principal Problem:    ACS (acute coronary syndrome) ([address]  Active Problems:    Essential hypertension    Acute chest pain    Status post insertion of drug-eluting stent into left anterior descending (LAD) artery for coronary artery disease  Resolved Problems:    Unstable angina ([address]       Problems and results from this hospitalization that need follow up. 1. Post cath follow up    Invasive procedures and treatments. L heart Cath Dr Brianna Ramirez 4/22  Findings:                      LM       Normal              LAD     Mid 80% and hazy              Cx        OM              RCA     20% mid              LVG     Not performed              EDP     10 mmHg              L-LAD  Atretic, poor flow to distal LAD              S-D      Patent              S-OM   Occluded ostially  Severe Ca++:None  Post Cath Dx:Severe LAD disease  Intervention:  PCI of LAD with [address] Course. Patient is a 61 yo female with CAD, s/p CABG who presented to the hospital with chest pain from the cardiology office. She has had exertional CP for the past few weeks. Pain was radiating down her left arm. Initial cardiac workup was unremarkable. She was admitted with unstable angina.  Serial enzymes were normal. She was seen by cardiology and underwent a left heart cath revealing an 80% LAD lesion which was stented and occlusion of one of dizziness    ** Take 1 tablet every 5 minutes for chest pain up to 3 times. Call doctor right away. rosuvastatin 20 MG tablet  Commonly known as:  CRESTOR  Take 1 tablet by mouth every evening  Notes to patient:  Use:  Cholesterol reduction  Side effects:  Muscle pain or soreness, stomach and intestinal upset     SM ASPIRIN ADULT LOW STRENGTH 81 MG EC tablet  Generic drug:  aspirin  TAKE 1 TABLET BY MOUTH ONE TIME A DAY  Notes to patient:  Side effects:  Unusual bleeding, bruising, stomach irritation           Where to Get Your Medications            Discharge recommendations given to patient. Follow Up. Cardiology in 1 week   Disposition. home  Activity. no heavy lifting, pushing, pulling  Diet: DIET CARDIAC;      Spent 35 minutes in discharge process. Signed:   Caity Johnson PA-C     4/23/2019 1:47 PM

## 2019-05-02 ENCOUNTER — TELEPHONE (OUTPATIENT)
Dept: CARDIOLOGY CLINIC | Age: 59
End: 2019-05-02

## 2019-05-02 NOTE — TELEPHONE ENCOUNTER
Pt was placed on Plavix after surgery 4/22/19. Pt states that she is having tingling in toes, ankles, fingers, and arms and also a little tightness in her chest. Please call pt to advise what to do.

## 2019-05-03 NOTE — TELEPHONE ENCOUNTER
Per DCE, patient needs to stay on the plavix because of her recent stent. DCE said patient needs to either be on Brillinta or Plavix and patient could not afford Kam[facility] [place] when she was in the hospital. I left message on patient's voicemail explaining this to her. I told her to call back with any questions.

## 2019-05-06 NOTE — TELEPHONE ENCOUNTER
Pt called in saying she wants to go back to Brilinta.      RX APPROVAL:  Last OV     4/23/19       ; plan, and labs reviewed

## 2019-05-08 ENCOUNTER — OFFICE VISIT (OUTPATIENT)
Dept: CARDIOLOGY CLINIC | Age: 59
End: 2019-05-08
Payer: COMMERCIAL

## 2019-05-08 ENCOUNTER — HOSPITAL ENCOUNTER (OUTPATIENT)
Age: 59
Discharge: HOME | End: 2019-05-08
Payer: COMMERCIAL

## 2019-05-08 VITALS
SYSTOLIC BLOOD PRESSURE: 114 MMHG | HEIGHT: 66 IN | WEIGHT: 175.2 LBS | DIASTOLIC BLOOD PRESSURE: 62 MMHG | HEART RATE: 72 BPM | BODY MASS INDEX: 28.16 KG/M2

## 2019-05-08 DIAGNOSIS — E78.2 MIXED HYPERLIPIDEMIA: ICD-10-CM

## 2019-05-08 DIAGNOSIS — I25.10 CORONARY ARTERY DISEASE INVOLVING NATIVE CORONARY ARTERY OF NATIVE HEART WITHOUT ANGINA PECTORIS: Primary | ICD-10-CM

## 2019-05-08 DIAGNOSIS — I10 ESSENTIAL HYPERTENSION: ICD-10-CM

## 2019-05-08 DIAGNOSIS — R07.89 CHEST DISCOMFORT: ICD-10-CM

## 2019-05-08 LAB
CHOLESTEROL, TOTAL: 190 MG/DL (ref 0–199)
D DIMER: <200 NG/ML DDU (ref 0–229)
HDLC SERPL-MCNC: 52 MG/DL (ref 40–60)
LDL CHOLESTEROL CALCULATED: ABNORMAL MG/DL
LDL CHOLESTEROL DIRECT: 103 MG/DL
SEDIMENTATION RATE, ERYTHROCYTE: 18 MM/HR (ref 0–30)
TRIGL SERPL-MCNC: 337 MG/DL (ref 0–150)
VLDLC SERPL CALC-MCNC: ABNORMAL MG/DL

## 2019-05-08 PROCEDURE — G8417 CALC BMI ABV UP PARAM F/U: HCPCS | Performed by: NURSE PRACTITIONER

## 2019-05-08 PROCEDURE — 85652 RBC SED RATE AUTOMATED: CPT

## 2019-05-08 PROCEDURE — G8427 DOCREV CUR MEDS BY ELIG CLIN: HCPCS | Performed by: NURSE PRACTITIONER

## 2019-05-08 PROCEDURE — 1111F DSCHRG MED/CURRENT MED MERGE: CPT | Performed by: NURSE PRACTITIONER

## 2019-05-08 PROCEDURE — 80061 LIPID PANEL: CPT

## 2019-05-08 PROCEDURE — 36415 COLL VENOUS BLD VENIPUNCTURE: CPT

## 2019-05-08 PROCEDURE — 1036F TOBACCO NON-USER: CPT | Performed by: NURSE PRACTITIONER

## 2019-05-08 PROCEDURE — 99214 OFFICE O/P EST MOD 30 MIN: CPT | Performed by: NURSE PRACTITIONER

## 2019-05-08 PROCEDURE — 3017F COLORECTAL CA SCREEN DOC REV: CPT | Performed by: NURSE PRACTITIONER

## 2019-05-08 PROCEDURE — 85379 FIBRIN DEGRADATION QUANT: CPT

## 2019-05-08 PROCEDURE — G8598 ASA/ANTIPLAT THER USED: HCPCS | Performed by: NURSE PRACTITIONER

## 2019-05-08 RX ORDER — RANOLAZINE 500 MG/1
500 TABLET, EXTENDED RELEASE ORAL 2 TIMES DAILY
Qty: 60 TABLET | Refills: 3 | Status: SHIPPED | OUTPATIENT
Start: 2019-05-08 | End: 2019-06-28 | Stop reason: DRUGHIGH

## 2019-05-08 NOTE — TELEPHONE ENCOUNTER
LMOM for pt and relayed message per [facility]. Approved med to the pharmacy . Let her know that script was sent.

## 2019-05-08 NOTE — PATIENT INSTRUCTIONS
Change from Plavix to [facility] today : inflammatory markers    Re-trial of Ranexa 500 mg twice a day    appt in four weeks

## 2019-05-08 NOTE — COMMUNICATION BODY
tolerated therapy to date. Current Outpatient Medications   Medication Sig Dispense Refill           clopidogrel (PLAVIX) 75 MG tablet Take 1 tablet by mouth daily 30 tablet 1    rosuvastatin (CRESTOR) 20 MG tablet Take 1 tablet by mouth every evening 90 tablet 3    hydrochlorothiazide (HYDRODIURIL) 25 MG tablet Take 1 tablet by mouth daily 90 tablet 3    lisinopril (PRINIVIL;ZESTRIL) 40 MG tablet Take 1 tablet by mouth daily 90 tablet 3    SM ASPIRIN ADULT LOW STRENGTH 81 MG EC tablet TAKE 1 TABLET BY MOUTH ONE TIME A DAY  30 tablet 0    ticagrelor (BRILINTA) 90 MG TABS tablet Take 1 tablet by mouth 2 times daily 60 tablet 5    nitroGLYCERIN (NITROSTAT) 0.4 MG SL tablet Place 1 tablet under the tongue every 5 minutes as needed for Chest pain up to max of 3 total doses. If no relief after 1 dose, call 911. 25 tablet 3       Objective:   PHYSICAL EXAM:       Vitals:    05/08/19 1436 05/08/19 1506   BP: 110/66 114/62   Pulse: 72    Weight: 175 lb 3.2 oz (79.5 kg)    Height: 5' 6\" (1.676 m)         VITALS:  /66   Pulse 72   Ht 5' 6\" (1.676 m)   Wt 175 lb 3.2 oz (79.5 kg)   BMI 28.28 kg/m²      CONSTITUTIONAL: Cooperative, no apparent distress, and appears well nourished / developed  NEUROLOGIC:  Awake and orientated to person, place and time. PSYCH: Calm affect. SKIN: Warm and dry: Lt radial unremarkable. HEENT: Sclera non-icteric, normocephalic, neck supple, no elevation of JVP, normal carotid pulses with no bruits and thyroid normal size. LUNGS:  No increased work of breathing and clear to auscultation, no crackles or wheezing. CARDIOVASCULAR:  Regular rate 68 and rhythm with no murmurs, gallops, rubs, or abnormal heart sounds, normal PMI. The apical impulses not displaced.                                Heart tones are crisp and normal                                                                                            Cervical veins are not engorged                 JVP less than 8 cm H2O                                                                              The carotid upstroke is normal in amplitude and contour without delay or bruit    ABDOMEN:  Normal bowel sounds, non-distended and non-tender to palpation   EXT: No edema, no calf tenderness. Pulses are present bilaterally. DATA:    Lab Results   Component Value Date    ALT 37 04/19/2019    AST 37 04/19/2019    ALKPHOS 65 04/19/2019    BILITOT 0.5 04/19/2019     Lab Results   Component Value Date    CREATININE 0.7 04/19/2019    BUN 15 04/19/2019     04/19/2019    K 3.6 04/19/2019     04/19/2019    CO2 25 04/19/2019     Lab Results   Component Value Date    WBC 9.8 04/23/2019    HGB 15.9 04/23/2019    HCT 46.0 04/23/2019    MCV 95.9 04/23/2019     04/23/2019     LABS: 9/28/17:    trig 182 HDL 57 LDL 93    Radiology Review:  Pertinent images / reports were reviewed as a part of this visit and reveals the following:     Last Angiogram: 8/16/17:  Coronary Findings  LM                 normal  LAD               60-70% mid, mild competitive distal flow, D1 100% mid  Cx                  OM2 100% with L-L collaterals  RCA               Normal  LVG               55%  EDP               15mmHg  SVG-OM                   Occluded  SVG-D                                 Patent  WADE-LAD                 Patent but very small and difficult to engage, multiple nonselective pictures taken    Last Stress Test: Oct '15:  FINDINGS there is an infarct in the lateral wall, with associated superimposed reversible ischemia. Remaining perfusion is normal.    Left Ventricular Ejection Fraction (LVEF):  61 %  Left Ventricular Wall Motion And Contractility:  Normal.  End-Diastolic Volume: 89 ml  End-Systolic Volume: 34 ml    Last Echo: May '18:  Summary   -There is mild concentric left ventricular hypertrophy.   -Left ventricular function is normal with ejection fraction estimated at   55%.  No regional wall motion abnormalities are

## 2019-05-08 NOTE — LETTER
[address] [facility] [place]  [address] [facility]  [address] [facility] [address] [address]  Phone: [phone]  Fax: [phone]      May 8, 2019     [place]  [address] [place] [id number]    Patient: Itzel Matthews  MR Number: [id number]  YOB: 1960  Date of Visit: 5/8/2019    Dear Dr. Ana dewitt:    [facility]     Outpatient Follow Up Damon Crowell / HPI: Hospital Follow Up secondary to status post coronary artery stenting    Hospital record has been reviewed  Hospital Course progressed as follows per discharge summary:   presented to the hospital with chest pain from the cardiology office. She has had exertional CP for the past few weeks. Pain was radiating down her left arm. Initial cardiac workup was unremarkable. She was admitted with unstable angina. Serial enzymes were normal. She was seen by cardiology and underwent a left heart cath revealing an 80% LAD lesion which was stented and occlusion of one of her bypasses. She tolerated the procedure well. She was monitored over night and discharged in stable condition     Itzel Matthews is 60 y.o. female who presents today for a routine follow up after a recent hospitalization related to the above mentioned issues. Subjective:   Since the time of discharge, the patient admits their symptoms have not changed. She feels no better. She walked around the store for about an hour and had to rest because she was SOB. She has associated pain in her chest and across her mid-back. When she is at rest she feels ok. Her discomfort/SOB come on strong with any activity. She had problems breathing during her hospitalization; and a little chest discomfort. It goes away after five minutes of rest. Her back & chest discomfort eases and doesn't totally go away : either mild or pulsating. She denies orthopnea/PND. She has no swelling.  Her feet to mid-leg tingle since taking plavix as does her feet and ankles. She has a thickness / tightness in her arms (she had this symptom prior to her bypass and had gone away). The patient is not experiencing palpitations. These symptoms show no change over the last many days. Her BP runs ~ 108-110/60-70  With regard to medication therapy the patient has been compliant with prescribed regimen. They have tolerated therapy to date. Current Outpatient Medications   Medication Sig Dispense Refill           clopidogrel (PLAVIX) 75 MG tablet Take 1 tablet by mouth daily 30 tablet 1    rosuvastatin (CRESTOR) 20 MG tablet Take 1 tablet by mouth every evening 90 tablet 3    hydrochlorothiazide (HYDRODIURIL) 25 MG tablet Take 1 tablet by mouth daily 90 tablet 3    lisinopril (PRINIVIL;ZESTRIL) 40 MG tablet Take 1 tablet by mouth daily 90 tablet 3    SM ASPIRIN ADULT LOW STRENGTH 81 MG EC tablet TAKE 1 TABLET BY MOUTH ONE TIME A DAY  30 tablet 0    ticagrelor (BRILINTA) 90 MG TABS tablet Take 1 tablet by mouth 2 times daily 60 tablet 5    nitroGLYCERIN (NITROSTAT) 0.4 MG SL tablet Place 1 tablet under the tongue every 5 minutes as needed for Chest pain up to max of 3 total doses. If no relief after 1 dose, call 911. 25 tablet 3       Objective:   PHYSICAL EXAM:       Vitals:    05/08/19 1436 05/08/19 1506   BP: 110/66 114/62   Pulse: 72    Weight: 175 lb 3.2 oz (79.5 kg)    Height: 5' 6\" (1.676 m)         VITALS:  /66   Pulse 72   Ht 5' 6\" (1.676 m)   Wt 175 lb 3.2 oz (79.5 kg)   BMI 28.28 kg/m²      CONSTITUTIONAL: Cooperative, no apparent distress, and appears well nourished / developed  NEUROLOGIC:  Awake and orientated to person, place and time. PSYCH: Calm affect. SKIN: Warm and dry: Lt radial unremarkable. HEENT: Sclera non-icteric, normocephalic, neck supple, no elevation of JVP, normal carotid pulses with no bruits and thyroid normal size.   LUNGS:  No increased work of breathing and clear to auscultation, no crackles or wheezing. CARDIOVASCULAR:  Regular rate 68 and rhythm with no murmurs, gallops, rubs, or abnormal heart sounds, normal PMI. The apical impulses not displaced. Heart tones are crisp and normal                                                                                            Cervical veins are not engorged                 JVP less than 8 cm H2O                                                                              The carotid upstroke is normal in amplitude and contour without delay or bruit    ABDOMEN:  Normal bowel sounds, non-distended and non-tender to palpation   EXT: No edema, no calf tenderness. Pulses are present bilaterally. DATA:    Lab Results   Component Value Date    ALT 37 04/19/2019    AST 37 04/19/2019    ALKPHOS 65 04/19/2019    BILITOT 0.5 04/19/2019     Lab Results   Component Value Date    CREATININE 0.7 04/19/2019    BUN 15 04/19/2019     04/19/2019    K 3.6 04/19/2019     04/19/2019    CO2 25 04/19/2019     Lab Results   Component Value Date    WBC 9.8 04/23/2019    HGB 15.9 04/23/2019    HCT 46.0 04/23/2019    MCV 95.9 04/23/2019     04/23/2019     LABS: 9/28/17:    trig 182 HDL 57 LDL 93    Radiology Review:  Pertinent images / reports were reviewed as a part of this visit and reveals the following:     Last Angiogram: 8/16/17:  Coronary Findings  LM                 normal  LAD               60-70% mid, mild competitive distal flow, D1 100% mid  Cx                  OM2 100% with L-L collaterals  RCA               Normal  LVG               55%  EDP               15mmHg  SVG-OM                   Occluded  SVG-D                                 Patent  WADE-LAD                 Patent but very small and difficult to engage, multiple nonselective pictures taken    Last Stress Test: Oct '15:  FINDINGS there is an infarct in the lateral wall, with associated superimposed reversible ischemia. Remaining perfusion is normal.    Left Ventricular Ejection Fraction (LVEF):  61 %  Left Ventricular Wall Motion And Contractility:  Normal.  End-Diastolic Volume: 89 ml  End-Systolic Volume: 34 ml    Last Echo: May '18:  Summary   -There is mild concentric left ventricular hypertrophy.   -Left ventricular function is normal with ejection fraction estimated at   55%. No regional wall motion abnormalities are noted.   -Normal diastolic function.   -Mitral annular calcification is present. Mild mitral regurgitation is   present.   -Mild tricuspid regurgitation. Estimated pulmonary artery systolic pressure   is at 28 mmHg assuming a right atrial pressure of 3 mmHg. Last Angiogram: 4/22/19:  Findings:                      LM       Normal              LAD     Mid 80% and hazy              Cx        OM              RCA     20% mid              LVG     Not performed              EDP     10 mmHg              L-LAD  Atretic, poor flow to distal LAD              S-D      Patent              S-OM   Occluded ostially  Post Cath Dx:Severe LAD disease  Intervention:  PCI of LAD with 2.75X23 Xience MAC    Assessment:      Diagnosis Orders   1. Coronary artery disease involving native coronary artery of native heart without angina pectoris   ~s/p PTCA LAD (atretic LIMA)  ~s/p CABG Jan '14, occl SVG > OM  ~DAPT (symptoms of tingling taking Plavix) / statin   ~has not been able to exercise given symptoms    2. Essential hypertension   ~controlled     3. Mixed hyperlipidemia   ~not recently done   ~remains on crestor     4. Chest discomfort   ~atypical / typical components of angina  ~back discomfort worsens with deep breaths   ~arms continue to feel tight / angina component  ~intolerant to isosorbide ; Ranexa costly as brand SEDIMENTATION RATE    D-DIMER, QUANTITATIVE     Patient  is stable since hospital discharge.     Plan:  Stop Plavix and begin Brilinta 90 mg bid ESR / d-Dimer (back discomfort on inspiration and persistent SOB)   Re-trial of Ranexa 500 mg bid   F/U in four weeks / lab dependent    I have addressed the patient's cardiac risk factors and adjusted pharmacologic treatment as needed. In addition, I have reinforced the need for patient directed risk factor modification. Thank you for allowing to us to participate in the care of Sandi Mena. If you have questions, please do not hesitate to call me. I look forward to following Jessie Bernardo along with you.     Sincerely,        Sayra Fernandes, JAMES - CNP

## 2019-05-08 NOTE — PROGRESS NOTES
[facility]     Outpatient Follow Up Note    CHIEF COMPLAINT / HPI: Hospital Follow Up secondary to status post coronary artery stenting    Hospital record has been reviewed  Hospital Course progressed as follows per discharge summary:   presented to the hospital with chest pain from the cardiology office. She has had exertional CP for the past few weeks. Pain was radiating down her left arm. Initial cardiac workup was unremarkable. She was admitted with unstable angina. Serial enzymes were normal. She was seen by cardiology and underwent a left heart cath revealing an 80% LAD lesion which was stented and occlusion of one of her bypasses. She tolerated the procedure well. She was monitored over night and discharged in stable condition     Hutchinson Maye is 60 y.o. female who presents today for a routine follow up after a recent hospitalization related to the above mentioned issues. Subjective:   Since the time of discharge, the patient admits their symptoms have not changed. She feels no better. She walked around the store for about an hour and had to rest because she was SOB. She has associated pain in her chest and across her mid-back. When she is at rest she feels ok. Her discomfort/SOB come on strong with any activity. She had problems breathing during her hospitalization; and a little chest discomfort. It goes away after five minutes of rest. Her back & chest discomfort eases and doesn't totally go away : either mild or pulsating. She denies orthopnea/PND. She has no swelling. Her feet to mid-leg tingle since taking plavix as does her feet and ankles. She has a thickness / tightness in her arms (she had this symptom prior to her bypass and had gone away). The patient is not experiencing palpitations. These symptoms show no change over the last many days. Her BP runs ~ 108-110/60-70  With regard to medication therapy the patient has been compliant with prescribed regimen.  They have tolerated therapy to date. 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    Hyperlipidemia     Hypertension     Unstable angina ([facility] [address] 8/15/2017     Social History:    Social History     Tobacco Use   Smoking Status Former Smoker    Packs/day: 2.00    Years: 20.00    Pack years: 40.00    Start date: 1977    Last attempt to quit: 1996    Years since quittin.2   Smokeless Tobacco Never Used     Current Medications:  Current Outpatient Medications   Medication Sig Dispense Refill    ticagrelor (BRILINTA) 90 MG TABS tablet Take 1 tablet by mouth 2 times daily 60 tablet 5    clopidogrel (PLAVIX) 75 MG tablet Take 1 tablet by mouth daily 30 tablet 1    rosuvastatin (CRESTOR) 20 MG tablet Take 1 tablet by mouth every evening 90 tablet 3    hydrochlorothiazide (HYDRODIURIL) 25 MG tablet Take 1 tablet by mouth daily 90 tablet 3    lisinopril (PRINIVIL;ZESTRIL) 40 MG tablet Take 1 tablet by mouth daily 90 tablet 3    SM ASPIRIN ADULT LOW STRENGTH 81 MG EC tablet TAKE 1 TABLET BY MOUTH ONE TIME A DAY  30 tablet 0    ticagrelor (BRILINTA) 90 MG TABS tablet Take 1 tablet by mouth 2 times daily 60 tablet 5    nitroGLYCERIN (NITROSTAT) 0.4 MG SL tablet Place 1 tablet under the tongue every 5 minutes as needed for Chest pain up to max of 3 total doses. If no relief after 1 dose, call 911. 25 tablet 3     No current facility-administered medications for this visit. REVIEW OF SYSTEMS:   CONSTITUTIONAL: No major weight gain or loss, fatigue, weakness, night sweats or fever. There's been no change in energy level, sleep pattern, or activity level. HEENT: No new vision difficulties or ringing in the ears. RESPIRATORY: No new SOB, PND, orthopnea or cough. CARDIOVASCULAR: See HPI  GI: No nausea, vomiting, diarrhea, constipation, abdominal pain or changes in bowel habits.   : No urinary frequency, urgency, incontinence hematuria or dysuria. SKIN: No cyanosis or skin lesions. MUSCULOSKELETAL: No new muscle or joint pain. NEUROLOGICAL: No syncope or TIA-like symptoms. PSYCHIATRIC: No anxiety, pain, insomnia or depression    Objective:   PHYSICAL EXAM:       Vitals:    05/08/19 1436 05/08/19 1506   BP: 110/66 114/62   Pulse: 72    Weight: 175 lb 3.2 oz (79.5 kg)    Height: 5' 6\" (1.676 m)         VITALS:  /66   Pulse 72   Ht 5' 6\" (1.676 m)   Wt 175 lb 3.2 oz (79.5 kg)   BMI 28.28 kg/m²     CONSTITUTIONAL: Cooperative, no apparent distress, and appears well nourished / developed  NEUROLOGIC:  Awake and orientated to person, place and time. PSYCH: Calm affect. SKIN: Warm and dry: Lt radial unremarkable. HEENT: Sclera non-icteric, normocephalic, neck supple, no elevation of JVP, normal carotid pulses with no bruits and thyroid normal size. LUNGS:  No increased work of breathing and clear to auscultation, no crackles or wheezing. CARDIOVASCULAR:  Regular rate 68 and rhythm with no murmurs, gallops, rubs, or abnormal heart sounds, normal PMI. The apical impulses not displaced. Heart tones are crisp and normal                                                                                          Cervical veins are not engorged                 JVP less than 8 cm H2O                                                                              The carotid upstroke is normal in amplitude and contour without delay or bruit    ABDOMEN:  Normal bowel sounds, non-distended and non-tender to palpation   EXT: No edema, no calf tenderness. Pulses are present bilaterally.     DATA:    Lab Results   Component Value Date    ALT 37 04/19/2019    AST 37 04/19/2019    ALKPHOS 65 04/19/2019    BILITOT 0.5 04/19/2019     Lab Results   Component Value Date    CREATININE 0.7 04/19/2019    BUN 15 04/19/2019     04/19/2019    K 3.6 04/19/2019     04/19/2019    CO2 25 04/19/2019 Cath Dx:Severe LAD disease  Intervention:  PCI of LAD with 2.75X23 Xience MAC    Assessment:      Diagnosis Orders   1. Coronary artery disease involving native coronary artery of native heart without angina pectoris   ~s/p PTCA LAD (atretic LIMA)  ~s/p CABG Jan '14, occl SVG > OM  ~DAPT (symptoms of tingling taking Plavix) / statin   ~has not been able to exercise given symptoms    2. Essential hypertension   ~controlled     3. Mixed hyperlipidemia   ~not recently done   ~remains on crestor     4. Chest discomfort   ~atypical / typical components of angina  ~back discomfort worsens with deep breaths   ~arms continue to feel tight / angina component  ~intolerant to isosorbide ; Ranexa costly as brand SEDIMENTATION RATE    D-DIMER, QUANTITATIVE     Patient  is stable since hospital discharge. Plan:  Stop Plavix and begin Brilinta 90 mg bid   ESR / d-Dimer (back discomfort on inspiration and persistent SOB)   Re-trial of Ranexa 500 mg bid   F/U in four weeks / lab dependent    I have addressed the patient's cardiac risk factors and adjusted pharmacologic treatment as needed. In addition, I have reinforced the need for patient directed risk factor modification. Further evaluation will be based upon the patient's clinical course and testing results. All questions and concerns were addressed to the patient. Alternatives to  treatment were discussed. The patient  currently  is not smoking. The risks related to smoking were reviewed with the patient. Recommend maintaining a smoke-free lifestyle. Dual Antiplatelet therapy has been recommended / prescribed for this patient. Education conducted on adverse reactions including bleeding was discussed. The patient verbalizes understanding. Pt is on a BB  Pt is on an ace-i  Pt is on a statin      Saturated fat diet discussed  Exercise program discussed    Thank you for allowing to us to participate in the care of Mansi Raymond.       [facility]

## 2019-05-09 ENCOUNTER — TELEPHONE (OUTPATIENT)
Dept: CARDIOLOGY CLINIC | Age: 59
End: 2019-05-09

## 2019-05-09 NOTE — TELEPHONE ENCOUNTER
Call placed to patient informed her of message below. She would like to know when can she  her paper work that was left on yesterday while in office. She states that this is due by the 15th.

## 2019-05-21 ENCOUNTER — TELEPHONE (OUTPATIENT)
Dept: CARDIOLOGY CLINIC | Age: 59
End: 2019-05-21

## 2019-05-21 NOTE — TELEPHONE ENCOUNTER
Who is requesting records: [address]    What is needed: Last OV Notes from 05/08/19    Phone number of the doctor/facility where records are to be sent: [phone]    Fax number of the doctor/facility where records are to be sent[de-identified] [phone] Claim #[id number] Attn: Topher Dillon

## 2019-05-21 NOTE — TELEPHONE ENCOUNTER
Records were faxed to the same number yesterday. I refaxed same ov note to same number with same claim number.

## 2019-05-29 ENCOUNTER — OFFICE VISIT (OUTPATIENT)
Dept: CARDIOLOGY CLINIC | Age: 59
End: 2019-05-29
Payer: COMMERCIAL

## 2019-05-29 VITALS
HEART RATE: 67 BPM | WEIGHT: 175 LBS | OXYGEN SATURATION: 97 % | BODY MASS INDEX: 28.12 KG/M2 | SYSTOLIC BLOOD PRESSURE: 124 MMHG | HEIGHT: 66 IN | DIASTOLIC BLOOD PRESSURE: 72 MMHG

## 2019-05-29 DIAGNOSIS — I10 ESSENTIAL HYPERTENSION: ICD-10-CM

## 2019-05-29 DIAGNOSIS — R07.89 CHEST DISCOMFORT: ICD-10-CM

## 2019-05-29 DIAGNOSIS — E78.2 MIXED HYPERLIPIDEMIA: ICD-10-CM

## 2019-05-29 DIAGNOSIS — I25.10 CORONARY ARTERY DISEASE INVOLVING NATIVE CORONARY ARTERY OF NATIVE HEART WITHOUT ANGINA PECTORIS: Primary | ICD-10-CM

## 2019-05-29 PROCEDURE — G8417 CALC BMI ABV UP PARAM F/U: HCPCS | Performed by: NURSE PRACTITIONER

## 2019-05-29 PROCEDURE — 99214 OFFICE O/P EST MOD 30 MIN: CPT | Performed by: NURSE PRACTITIONER

## 2019-05-29 PROCEDURE — 1036F TOBACCO NON-USER: CPT | Performed by: NURSE PRACTITIONER

## 2019-05-29 PROCEDURE — 93000 ELECTROCARDIOGRAM COMPLETE: CPT | Performed by: NURSE PRACTITIONER

## 2019-05-29 PROCEDURE — G8427 DOCREV CUR MEDS BY ELIG CLIN: HCPCS | Performed by: NURSE PRACTITIONER

## 2019-05-29 PROCEDURE — 3017F COLORECTAL CA SCREEN DOC REV: CPT | Performed by: NURSE PRACTITIONER

## 2019-05-29 PROCEDURE — G8598 ASA/ANTIPLAT THER USED: HCPCS | Performed by: NURSE PRACTITIONER

## 2019-05-29 NOTE — LETTER
[address] [place] [address] Cardiology [place] [address] [address] [address]  Phone: [phone]  Fax: [phone]      May 29, 2019     [address] [place] [address]    Patient: Vielka Alvarez  MR Number: [id number]  YOB: 1960  Date of Visit: 5/29/2019    Dear Dr. Cande Tolentino:    [address]     Outpatient Follow Up Note      Walter Nguyễn is a 61 yo female who presents today with a history of CAD s/p CABG Jan '14, occl SVG > OM by cath Aug '17; s/p PTCA LAD Apri '19 with SVG > OM occluded ostially and hyperlipidemia. CHIEF COMPLAINT / HPI:  Follow Up secondary to CAD resuming Ranexa. Subjective:     She'd inadvertently took Brilinta along with plavix instead of Ranexa (she did not notice that she'd picked up both plavix and brilinta from her pharmacy)  Yesterday she did sit ups and today her back and chest are sore. Any exertion leaves her feeling terrible. Its a pulsating feeling in her back and discomfort in her chest. She has associated SOB. Her legs and feet feel cold. There is no SOB/WASHBURN. The patient denies orthopnea/PND. The patient does not have swelling. The patients weight is stable . The patient is not experiencing palpitations or dizziness. These symptoms show no change since the last OV. With regard to medication therapy the patient has been compliant with prescribed regimen. They have tolerated therapy to date.        Current Outpatient Medications   Medication Sig Dispense Refill    lisinopril (PRINIVIL;ZESTRIL) 40 MG tablet TAKE 1 TABLET BY MOUTH ONE TIME A DAY  90 tablet 3    ticagrelor (BRILINTA) 90 MG TABS tablet Take 1 tablet by mouth 2 times daily 60 tablet 5    ranolazine (RANEXA) 500 MG extended release tablet Take 1 tablet by mouth 2 times daily 60 tablet 3    rosuvastatin (CRESTOR) 20 MG tablet Take 1 tablet by mouth every evening 90 tablet 3  hydrochlorothiazide (HYDRODIURIL) 25 MG tablet Take 1 tablet by mouth daily 90 tablet 3    SM ASPIRIN ADULT LOW STRENGTH 81 MG EC tablet TAKE 1 TABLET BY MOUTH ONE TIME A DAY  30 tablet 0    nitroGLYCERIN (NITROSTAT) 0.4 MG SL tablet Place 1 tablet under the tongue every 5 minutes as needed for Chest pain up to max of 3 total doses. If no relief after 1 dose, call 911. 25 tablet 3         Objective:   PHYSICAL EXAM:       Vitals:    05/29/19 0953 05/29/19 1011   BP: 124/68 124/72   Site: Left Upper Arm    Position: Sitting    Cuff Size: Medium Adult    Pulse: 67    SpO2: 97%    Weight: 175 lb (79.4 kg)    Height: 5' 6\" (1.676 m)         VITALS:  /68 (Site: Left Upper Arm, Position: Sitting, Cuff Size: Medium Adult)   Pulse 67   Ht 5' 6\" (1.676 m)   Wt 175 lb (79.4 kg)   SpO2 97%   BMI 28.25 kg/m²      CONSTITUTIONAL: Cooperative, no apparent distress, and appears well nourished / developed  NEUROLOGIC:  Awake and orientated to person, place and time. PSYCH: Calm affect. SKIN: Warm and dry: Lt radial unremarkable. HEENT: Sclera non-icteric, normocephalic, neck supple, no elevation of JVP, normal carotid pulses with no bruits and thyroid normal size. LUNGS:  No increased work of breathing and clear to auscultation, no crackles or wheezing. CARDIOVASCULAR:  Regular rate 80 and rhythm with no murmurs, gallops, rubs, or abnormal heart sounds, normal PMI. The apical impulses not displaced.                                Heart tones are crisp and normal                                                                                            Cervical veins are not engorged                 JVP less than 8 cm H2O                                                                              The carotid upstroke is normal in amplitude and contour without delay or bruit    ABDOMEN:  Normal bowel sounds, non-distended and non-tender to palpation EXT: No edema, no calf tenderness. Pulses are present bilaterally. DATA:    Lab Results   Component Value Date    ALT 37 04/19/2019    AST 37 04/19/2019    ALKPHOS 65 04/19/2019    BILITOT 0.5 04/19/2019     Lab Results   Component Value Date    CREATININE 0.7 04/19/2019    BUN 15 04/19/2019     04/19/2019    K 3.6 04/19/2019     04/19/2019    CO2 25 04/19/2019       Lab Results   Component Value Date    CHOL 190 05/08/2019    CHOL 142 08/16/2017    CHOL 208 (H) 01/16/2014     Lab Results   Component Value Date    TRIG 337 (H) 05/08/2019     Lab Results   Component Value Date    HDL 52 05/08/2019     Lab Results   Component Value Date    LDL-direct 103 05/08/2019         Radiology Review:  Pertinent images / reports were reviewed as a part of this visit and reveals the following:     Assessment:      Diagnosis Orders   1. Coronary artery disease involving native coronary artery of native heart without angina pectoris   ~symptoms unchanged ; has only taken Ranexa for ~ 1 week as mistakenly thought Briliant at bid was Ranexa  ~s/p PTCA LAD (atretic LIMA)  ~s/p CABG Jan '14, occl SVG > OM  ~DAPT (symptoms of tingling taking Plavix [c/o feet feeling cold]) / statin   ~had resumed exercising yet c/o discomfort afterwards    2. Essential hypertension   ~controlled     3. Mixed hyperlipidemia   ~trig elevated ; direct LDL near goal. Glu 121  ~remains on crestor     4. Chest discomfort   ~atypical / typical components of angina  ~back discomfort after exercise : ESR 18. d-Dimer < 200  ~intolerant to isosorbide           Patient  is stable since hospital discharge. Plan:  Continue present management as only taken Ranexa for 1 week    F/U in 2 weeks prior to RTW    Further evaluation will be based upon the patient's clinical course    Thank you for allowing to us to participate in the care of Remy Varela. If you have questions, please do not hesitate to call me.  I look forward to following Markie Michel along with you.     Sincerely,    Lyudmila Simpson, JAMES - CNP

## 2019-05-29 NOTE — PROGRESS NOTES
[facility]     Outpatient Follow Up Note      Hayley Gu is a 59 yo female who presents today with a history of CAD s/p CABG , occl SVG > OM by cath Aug '17; s/p PTCA LAD Apri '19 with SVG > OM occluded ostially and hyperlipidemia. CHIEF COMPLAINT / HPI:  Follow Up secondary to CAD resuming Ranexa. Subjective:     She'd inadvertently took Brilinta along with plavix instead of Ranexa (she did not notice that she'd picked up both plavix and brilinta from her pharmacy)  Yesterday she did sit ups and today her back and chest are sore. Any exertion leaves her feeling terrible. Its a pulsating feeling in her back and discomfort in her chest. She has associated SOB. Her legs and feet feel cold. There is no SOB/WASHBURN. The patient denies orthopnea/PND. The patient does not have swelling. The patients weight is stable . The patient is not experiencing palpitations or dizziness. These symptoms show no change since the last OV. With regard to medication therapy the patient has been compliant with prescribed regimen. They have tolerated therapy to date.        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    Hyperlipidemia     Hypertension     Unstable angina ([facility] [address] 8/15/2017     Social History:    Social History     Tobacco Use   Smoking Status Former Smoker    Packs/day: 2.00    Years: 20.00    Pack years: 40.00    Start date: 1977    Last attempt to quit: 1996    Years since quittin.3   Smokeless Tobacco Never Used     Current Medications:  Current Outpatient Medications   Medication Sig Dispense Refill    lisinopril (PRINIVIL;ZESTRIL) 40 MG tablet TAKE 1 TABLET BY MOUTH ONE TIME A DAY  90 tablet 3    ticagrelor (BRILINTA) 90 MG TABS tablet Take 1 tablet by mouth 2 times daily 60 tablet 5    ranolazine (RANEXA) 500 MG extended release tablet Take 1 tablet by mouth 2 times daily dry: Lt radial unremarkable. HEENT: Sclera non-icteric, normocephalic, neck supple, no elevation of JVP, normal carotid pulses with no bruits and thyroid normal size. LUNGS:  No increased work of breathing and clear to auscultation, no crackles or wheezing. CARDIOVASCULAR:  Regular rate 80 and rhythm with no murmurs, gallops, rubs, or abnormal heart sounds, normal PMI. The apical impulses not displaced. Heart tones are crisp and normal                                                                                            Cervical veins are not engorged                 JVP less than 8 cm H2O                                                                              The carotid upstroke is normal in amplitude and contour without delay or bruit    ABDOMEN:  Normal bowel sounds, non-distended and non-tender to palpation   EXT: No edema, no calf tenderness. Pulses are present bilaterally.     DATA:    Lab Results   Component Value Date    ALT 37 04/19/2019    AST 37 04/19/2019    ALKPHOS 65 04/19/2019    BILITOT 0.5 04/19/2019     Lab Results   Component Value Date    CREATININE 0.7 04/19/2019    BUN 15 04/19/2019     04/19/2019    K 3.6 04/19/2019     04/19/2019    CO2 25 04/19/2019     Lab Results   Component Value Date    WBC 9.8 04/23/2019    HGB 15.9 04/23/2019    HCT 46.0 04/23/2019    MCV 95.9 04/23/2019     04/23/2019     LABS: 9/28/17:    trig 182 HDL 57 LDL 93    Lab Results   Component Value Date    CHOL 190 05/08/2019    CHOL 142 08/16/2017    CHOL 208 (H) 01/16/2014     Lab Results   Component Value Date    TRIG 337 (H) 05/08/2019    TRIG 165 (H) 08/16/2017    TRIG 281 (H) 01/16/2014     Lab Results   Component Value Date    HDL 52 05/08/2019    HDL 53 08/16/2017    HDL 40 01/16/2014     Lab Results   Component Value Date    LDL-direct 103 05/08/2019         Radiology Review:  Pertinent images / reports were reviewed as a part of this visit and reveals the following:     Last Angiogram: 8/16/17:  Coronary Findings  LM                 normal  LAD               60-70% mid, mild competitive distal flow, D1 100% mid  Cx                  OM2 100% with L-L collaterals  RCA               Normal  LVG               55%  EDP               15mmHg  SVG-OM                   Occluded  SVG-D                                 Patent  WADE-LAD                 Patent but very small and difficult to engage, multiple nonselective pictures taken    Last Stress Test: Oct '15:  FINDINGS there is an infarct in the lateral wall, with associated superimposed reversible ischemia. Remaining perfusion is normal.    Left Ventricular Ejection Fraction (LVEF):  61 %  Left Ventricular Wall Motion And Contractility:  Normal.  End-Diastolic Volume: 89 ml  End-Systolic Volume: 34 ml    Last Echo: May '18:  Summary   -There is mild concentric left ventricular hypertrophy.   -Left ventricular function is normal with ejection fraction estimated at   55%. No regional wall motion abnormalities are noted.   -Normal diastolic function.   -Mitral annular calcification is present. Mild mitral regurgitation is   present.   -Mild tricuspid regurgitation. Estimated pulmonary artery systolic pressure   is at 28 mmHg assuming a right atrial pressure of 3 mmHg. Last Angiogram: 4/22/19:  Findings:                      LM       Normal              LAD     Mid 80% and hazy              Cx        OM              RCA     20% mid              LVG     Not performed              EDP     10 mmHg              L-LAD  Atretic, poor flow to distal LAD              S-D      Patent              S-OM   Occluded ostially  Post Cath Dx:Severe LAD disease  Intervention:  PCI of LAD with 2.75X23 Xience MAC    Assessment:      Diagnosis Orders   1.  Coronary artery disease involving native coronary artery of native heart without angina pectoris   ~symptoms unchanged ; has only taken Ranexa for ~ 1 week as mistakenly thought

## 2019-05-29 NOTE — COMMUNICATION BODY
[address]     Outpatient Follow Up Note      Brigette Vyas is a 61 yo female who presents today with a history of CAD s/p CABG Jan '14, occl SVG > OM by cath Aug '17; s/p PTCA LAD Apri '19 with SVG > OM occluded ostially and hyperlipidemia. CHIEF COMPLAINT / HPI:  Follow Up secondary to CAD resuming Ranexa. Subjective:     She'd inadvertently took Brilinta along with plavix instead of Ranexa (she did not notice that she'd picked up both plavix and brilinta from her pharmacy)  Yesterday she did sit ups and today her back and chest are sore. Any exertion leaves her feeling terrible. Its a pulsating feeling in her back and discomfort in her chest. She has associated SOB. Her legs and feet feel cold. There is no SOB/WASHBURN. The patient denies orthopnea/PND. The patient does not have swelling. The patients weight is stable . The patient is not experiencing palpitations or dizziness. These symptoms show no change since the last OV. With regard to medication therapy the patient has been compliant with prescribed regimen. They have tolerated therapy to date. Current Outpatient Medications   Medication Sig Dispense Refill    lisinopril (PRINIVIL;ZESTRIL) 40 MG tablet TAKE 1 TABLET BY MOUTH ONE TIME A DAY  90 tablet 3    ticagrelor (BRILINTA) 90 MG TABS tablet Take 1 tablet by mouth 2 times daily 60 tablet 5    ranolazine (RANEXA) 500 MG extended release tablet Take 1 tablet by mouth 2 times daily 60 tablet 3    rosuvastatin (CRESTOR) 20 MG tablet Take 1 tablet by mouth every evening 90 tablet 3    hydrochlorothiazide (HYDRODIURIL) 25 MG tablet Take 1 tablet by mouth daily 90 tablet 3    SM ASPIRIN ADULT LOW STRENGTH 81 MG EC tablet TAKE 1 TABLET BY MOUTH ONE TIME A DAY  30 tablet 0    nitroGLYCERIN (NITROSTAT) 0.4 MG SL tablet Place 1 tablet under the tongue every 5 minutes as needed for Chest pain up to max of 3 total doses.  If no relief after 1 dose, call 911. 25 tablet 3 Objective:   PHYSICAL EXAM:       Vitals:    05/29/19 0953 05/29/19 1011   BP: 124/68 124/72   Site: Left Upper Arm    Position: Sitting    Cuff Size: Medium Adult    Pulse: 67    SpO2: 97%    Weight: 175 lb (79.4 kg)    Height: 5' 6\" (1.676 m)         VITALS:  /68 (Site: Left Upper Arm, Position: Sitting, Cuff Size: Medium Adult)   Pulse 67   Ht 5' 6\" (1.676 m)   Wt 175 lb (79.4 kg)   SpO2 97%   BMI 28.25 kg/m²      CONSTITUTIONAL: Cooperative, no apparent distress, and appears well nourished / developed  NEUROLOGIC:  Awake and orientated to person, place and time. PSYCH: Calm affect. SKIN: Warm and dry: Lt radial unremarkable. HEENT: Sclera non-icteric, normocephalic, neck supple, no elevation of JVP, normal carotid pulses with no bruits and thyroid normal size. LUNGS:  No increased work of breathing and clear to auscultation, no crackles or wheezing. CARDIOVASCULAR:  Regular rate 80 and rhythm with no murmurs, gallops, rubs, or abnormal heart sounds, normal PMI. The apical impulses not displaced. Heart tones are crisp and normal                                                                                            Cervical veins are not engorged                 JVP less than 8 cm H2O                                                                              The carotid upstroke is normal in amplitude and contour without delay or bruit    ABDOMEN:  Normal bowel sounds, non-distended and non-tender to palpation   EXT: No edema, no calf tenderness. Pulses are present bilaterally.     DATA:    Lab Results   Component Value Date    ALT 37 04/19/2019    AST 37 04/19/2019    ALKPHOS 65 04/19/2019    BILITOT 0.5 04/19/2019     Lab Results   Component Value Date    CREATININE 0.7 04/19/2019    BUN 15 04/19/2019     04/19/2019    K 3.6 04/19/2019     04/19/2019    CO2 25 04/19/2019       Lab Results   Component Value Date    CHOL 190 05/08/2019    CHOL 142 08/16/2017    CHOL 208 (H) 01/16/2014     Lab Results   Component Value Date    TRIG 337 (H) 05/08/2019     Lab Results   Component Value Date    HDL 52 05/08/2019     Lab Results   Component Value Date    LDL-direct 103 05/08/2019         Radiology Review:  Pertinent images / reports were reviewed as a part of this visit and reveals the following:     Assessment:      Diagnosis Orders   1. Coronary artery disease involving native coronary artery of native heart without angina pectoris   ~symptoms unchanged ; has only taken Ranexa for ~ 1 week as mistakenly thought Briliant at bid was Ranexa  ~s/p PTCA LAD (atretic LIMA)  ~s/p CABG Jan '14, occl SVG > OM  ~DAPT (symptoms of tingling taking Plavix [c/o feet feeling cold]) / statin   ~had resumed exercising yet c/o discomfort afterwards    2. Essential hypertension   ~controlled     3. Mixed hyperlipidemia   ~trig elevated ; direct LDL near goal. Glu 121  ~remains on crestor     4. Chest discomfort   ~atypical / typical components of angina  ~back discomfort after exercise : ESR 18. d-Dimer < 200  ~intolerant to isosorbide           Patient  is stable since hospital discharge. Plan:  Continue present management as only taken Ranexa for 1 week    F/U in 2 weeks prior to RTW    Further evaluation will be based upon the patient's clinical course    Thank you for allowing to us to participate in the care of Vielka Null  Documentation of today's visit sent to PCP

## 2019-05-29 NOTE — LETTER
[address] Cardiology - [place] [address] [address] [address]  Phone: [phone]  Fax: [phone]      May 29, 2019     Patient: Nu Jasso   YOB: 1960   Date of Visit: 5/29/2019       To Whom It May Concern: It is my medical opinion that Ayad Obrien should remain out   of work until June 17th. If you have any questions or concerns, please don't hesitate to call.     Sincerely,        Laura Temple, APRN - CNP

## 2019-06-14 ENCOUNTER — OFFICE VISIT (OUTPATIENT)
Dept: CARDIOLOGY CLINIC | Age: 59
End: 2019-06-14
Payer: COMMERCIAL

## 2019-06-14 VITALS
SYSTOLIC BLOOD PRESSURE: 112 MMHG | WEIGHT: 175 LBS | HEART RATE: 66 BPM | HEIGHT: 66 IN | OXYGEN SATURATION: 99 % | DIASTOLIC BLOOD PRESSURE: 64 MMHG | RESPIRATION RATE: 16 BRPM | BODY MASS INDEX: 28.12 KG/M2

## 2019-06-14 DIAGNOSIS — E78.2 MIXED HYPERLIPIDEMIA: ICD-10-CM

## 2019-06-14 DIAGNOSIS — I25.10 CORONARY ARTERY DISEASE INVOLVING NATIVE CORONARY ARTERY OF NATIVE HEART WITHOUT ANGINA PECTORIS: Primary | ICD-10-CM

## 2019-06-14 DIAGNOSIS — I10 ESSENTIAL HYPERTENSION: ICD-10-CM

## 2019-06-14 DIAGNOSIS — R07.89 CHEST DISCOMFORT: ICD-10-CM

## 2019-06-14 PROCEDURE — G8427 DOCREV CUR MEDS BY ELIG CLIN: HCPCS | Performed by: NURSE PRACTITIONER

## 2019-06-14 PROCEDURE — G8417 CALC BMI ABV UP PARAM F/U: HCPCS | Performed by: NURSE PRACTITIONER

## 2019-06-14 PROCEDURE — G8598 ASA/ANTIPLAT THER USED: HCPCS | Performed by: NURSE PRACTITIONER

## 2019-06-14 PROCEDURE — 3017F COLORECTAL CA SCREEN DOC REV: CPT | Performed by: NURSE PRACTITIONER

## 2019-06-14 PROCEDURE — 1036F TOBACCO NON-USER: CPT | Performed by: NURSE PRACTITIONER

## 2019-06-14 PROCEDURE — 99214 OFFICE O/P EST MOD 30 MIN: CPT | Performed by: NURSE PRACTITIONER

## 2019-06-14 NOTE — COMMUNICATION BODY
[facility]     Outpatient Follow Up Note      Lenwood Schilder is a 59 yo female who presents today with a history of CAD s/p CABG Jan '14, occl SVG > OM by cath Aug '17; s/p PTCA LAD Apri '19 with SVG > OM occluded ostially and hyperlipidemia. CHIEF COMPLAINT / HPI:  Follow Up secondary to CAD taking Ranexa. Subjective:   she''ll have a little chest discomfort here/there lasting ~ 5 minutes. It may happen once a week. She feels good. She went to [place] for two weeks, did water aerobics and did well. She had no discomfort nor felt the pulsation in her back/chest. She's ready to resume exercising and RTW. There is no SOB/WASHBURN. The patient denies orthopnea/PND. The patient does not have swelling. The patients weight is stable . The patient is not experiencing palpitations or dizziness. These symptoms have improved since the last OV 2 weeks ago. With regard to medication therapy the patient has been compliant with prescribed regimen. They have tolerated therapy to date. Current Outpatient Medications   Medication Sig Dispense Refill    lisinopril (PRINIVIL;ZESTRIL) 40 MG tablet TAKE 1 TABLET BY MOUTH ONE TIME A DAY  90 tablet 3    ticagrelor (BRILINTA) 90 MG TABS tablet Take 1 tablet by mouth 2 times daily 60 tablet 5    ranolazine (RANEXA) 500 MG extended release tablet Take 1 tablet by mouth 2 times daily 60 tablet 3    rosuvastatin (CRESTOR) 20 MG tablet Take 1 tablet by mouth every evening 90 tablet 3    hydrochlorothiazide (HYDRODIURIL) 25 MG tablet Take 1 tablet by mouth daily 90 tablet 3    SM ASPIRIN ADULT LOW STRENGTH 81 MG EC tablet TAKE 1 TABLET BY MOUTH ONE TIME A DAY  30 tablet 0    nitroGLYCERIN (NITROSTAT) 0.4 MG SL tablet Place 1 tablet under the tongue every 5 minutes as needed for Chest pain up to max of 3 total doses.  If no relief after 1 dose, call 911. 25 tablet 3       Objective:   PHYSICAL EXAM:       Vitals:    06/14/19 0821 06/14/19 0832   BP: 118/74 112/64 Site: Left Upper Arm    Position: Sitting    Cuff Size: Large Adult    Pulse: 66    Resp: 16    SpO2: 99%    Weight: 175 lb (79.4 kg)    Height: 5' 6\" (1.676 m)         VITALS:  /74 (Site: Left Upper Arm, Position: Sitting, Cuff Size: Large Adult)   Pulse 66   Resp 16   Ht 5' 6\" (1.676 m)   Wt 175 lb (79.4 kg)   SpO2 99%   BMI 28.25 kg/m²      CONSTITUTIONAL: Cooperative, no apparent distress, and appears well nourished / developed  NEUROLOGIC:  Awake and orientated to person, place and time. PSYCH: Calm affect. SKIN: Warm and dry; tan  HEENT: Sclera non-icteric, normocephalic, neck supple, no elevation of JVP, normal carotid pulses with no bruits and thyroid normal size. LUNGS:  No increased work of breathing and clear to auscultation, no crackles or wheezing. CARDIOVASCULAR:  Regular rate 72 and rhythm with no murmurs, gallops, rubs, or abnormal heart sounds, normal PMI. The apical impulses not displaced. Heart tones are crisp and normal                                                                                            Cervical veins are not engorged                 JVP less than 8 cm H2O                                                                              The carotid upstroke is normal in amplitude and contour without delay or bruit    ABDOMEN:  Normal bowel sounds, non-distended and non-tender to palpation   EXT: No edema, no calf tenderness. Pulses are present bilaterally.     DATA:      Lab Results   Component Value Date    CHOL 190 05/08/2019    CHOL 142 08/16/2017    CHOL 208 (H) 01/16/2014     Lab Results   Component Value Date    TRIG 337 (H) 05/08/2019    TRIG 165 (H) 08/16/2017    TRIG 281 (H) 01/16/2014     Lab Results   Component Value Date    HDL 52 05/08/2019    HDL 53 08/16/2017    HDL 40 01/16/2014     Lab Results   Component Value Date    LDL-direct 103 05/08/2019         Radiology Review:  Pertinent images / reports were reviewed as a part of this visit and reveals the following:     Last Angiogram: 8/16/17:  Coronary Findings  LM                 normal  LAD               60-70% mid, mild competitive distal flow, D1 100% mid  Cx                  OM2 100% with L-L collaterals  RCA               Normal  LVG               55%  EDP               15mmHg  SVG-OM                   Occluded  SVG-D                                 Patent  WADE-LAD                 Patent but very small and difficult to engage, multiple nonselective pictures taken    Last Stress Test: Oct '15:  FINDINGS there is an infarct in the lateral wall, with associated superimposed reversible ischemia. Remaining perfusion is normal.    Left Ventricular Ejection Fraction (LVEF):  61 %  Left Ventricular Wall Motion And Contractility:  Normal.  End-Diastolic Volume: 89 ml  End-Systolic Volume: 34 ml    Last Echo: May '18:  Summary   -There is mild concentric left ventricular hypertrophy.   -Left ventricular function is normal with ejection fraction estimated at   55%. No regional wall motion abnormalities are noted.   -Normal diastolic function.   -Mitral annular calcification is present. Mild mitral regurgitation is   present.   -Mild tricuspid regurgitation. Estimated pulmonary artery systolic pressure   is at 28 mmHg assuming a right atrial pressure of 3 mmHg. Last Angiogram: 4/22/19:  Findings:                      LM       Normal              LAD     Mid 80% and hazy              Cx        OM              RCA     20% mid              LVG     Not performed              EDP     10 mmHg              L-LAD  Atretic, poor flow to distal LAD              S-D      Patent              S-OM   Occluded ostially  Post Cath Dx:Severe LAD disease  Intervention:  PCI of LAD with 2.75X23 Xience MAC    Assessment:      Diagnosis Orders   1.  Coronary artery disease involving native coronary artery of native heart without angina pectoris   ~improved : feeling better on Ranexa  ~doing some exercising in the form of water aeorbics ; ready to resume routine exercising   ~s/p PTCA LAD (atretic LIMA)  ~s/p CABG Jan '14, occl SVG > OM  ~DAPT (symptoms of tingling taking Plavix [c/o feet feeling cold]) / statin     2. Essential hypertension   ~controlled     3. Mixed hyperlipidemia   ~unchanged   ~trig elevated ; direct LDL near goal. Glu 121  ~remains on crestor     4. Chest discomfort   ~improved ; occ discomfort occurring ~ weekly   ~hx of back discomfort after exercise : ESR 18. d-Dimer < 200  ~intolerant to isosorbide           Patient  is stable since hospital discharge. Plan:  Continue present management    ~will be RTW on 6/17   F/U in 4 months    Further evaluation will be based upon the patient's clinical course     Thank you for allowing to us to participate in the care of Sheba Glover.       [facility]

## 2019-06-14 NOTE — LETTER
[address] [place] [address] Cardiology - [place] [address] [address] [address]  Phone: [phone]  Fax: [phone]        June 14, 2019     Patient: Delma Valencia   YOB: 1960   Date of Visit: 6/14/2019       To Whom It May Concern: It is my medical opinion that Sandra Atkins may return to full duty on June 17th  with no restrictions. If you have any questions or concerns, please don't hesitate to call.     Sincerely,        JAMES Hartmann - CNP

## 2019-06-14 NOTE — PROGRESS NOTES
evening 90 tablet 3    hydrochlorothiazide (HYDRODIURIL) 25 MG tablet Take 1 tablet by mouth daily 90 tablet 3    SM ASPIRIN ADULT LOW STRENGTH 81 MG EC tablet TAKE 1 TABLET BY MOUTH ONE TIME A DAY  30 tablet 0    nitroGLYCERIN (NITROSTAT) 0.4 MG SL tablet Place 1 tablet under the tongue every 5 minutes as needed for Chest pain up to max of 3 total doses. If no relief after 1 dose, call 911. 25 tablet 3     No current facility-administered medications for this visit. REVIEW OF SYSTEMS:   CONSTITUTIONAL: No major weight gain or loss, fatigue, weakness, night sweats or fever. There's been no change in energy level, sleep pattern, or activity level. HEENT: No new vision difficulties or ringing in the ears. RESPIRATORY: No new SOB, PND, orthopnea or cough. CARDIOVASCULAR: See HPI  GI: No nausea, vomiting, diarrhea, constipation, abdominal pain or changes in bowel habits. : No urinary frequency, urgency, incontinence hematuria or dysuria. SKIN: No cyanosis or skin lesions. MUSCULOSKELETAL: No new muscle or joint pain. NEUROLOGICAL: No syncope or TIA-like symptoms. PSYCHIATRIC: No anxiety, pain, insomnia or depression    Objective:   PHYSICAL EXAM:       Vitals:    06/14/19 0821 06/14/19 0832   BP: 118/74 112/64   Site: Left Upper Arm    Position: Sitting    Cuff Size: Large Adult    Pulse: 66    Resp: 16    SpO2: 99%    Weight: 175 lb (79.4 kg)    Height: 5' 6\" (1.676 m)         VITALS:  /74 (Site: Left Upper Arm, Position: Sitting, Cuff Size: Large Adult)   Pulse 66   Resp 16   Ht 5' 6\" (1.676 m)   Wt 175 lb (79.4 kg)   SpO2 99%   BMI 28.25 kg/m²     CONSTITUTIONAL: Cooperative, no apparent distress, and appears well nourished / developed  NEUROLOGIC:  Awake and orientated to person, place and time. PSYCH: Calm affect.   SKIN: Warm and dry; tan  HEENT: Sclera non-icteric, normocephalic, neck supple, no elevation of JVP, normal carotid pulses with no bruits and thyroid normal size.  LUNGS:  No increased work of breathing and clear to auscultation, no crackles or wheezing. CARDIOVASCULAR:  Regular rate 72 and rhythm with no murmurs, gallops, rubs, or abnormal heart sounds, normal PMI. The apical impulses not displaced. Heart tones are crisp and normal                                                                                            Cervical veins are not engorged                 JVP less than 8 cm H2O                                                                              The carotid upstroke is normal in amplitude and contour without delay or bruit    ABDOMEN:  Normal bowel sounds, non-distended and non-tender to palpation   EXT: No edema, no calf tenderness. Pulses are present bilaterally.     DATA:    Lab Results   Component Value Date    ALT 37 04/19/2019    AST 37 04/19/2019    ALKPHOS 65 04/19/2019    BILITOT 0.5 04/19/2019     Lab Results   Component Value Date    CREATININE 0.7 04/19/2019    BUN 15 04/19/2019     04/19/2019    K 3.6 04/19/2019     04/19/2019    CO2 25 04/19/2019     Lab Results   Component Value Date    WBC 9.8 04/23/2019    HGB 15.9 04/23/2019    HCT 46.0 04/23/2019    MCV 95.9 04/23/2019     04/23/2019     LABS: 9/28/17:    trig 182 HDL 57 LDL 93    Lab Results   Component Value Date    CHOL 190 05/08/2019    CHOL 142 08/16/2017    CHOL 208 (H) 01/16/2014     Lab Results   Component Value Date    TRIG 337 (H) 05/08/2019    TRIG 165 (H) 08/16/2017    TRIG 281 (H) 01/16/2014     Lab Results   Component Value Date    HDL 52 05/08/2019    HDL 53 08/16/2017    HDL 40 01/16/2014     Lab Results   Component Value Date    LDL-direct 103 05/08/2019         Radiology Review:  Pertinent images / reports were reviewed as a part of this visit and reveals the following:     Last Angiogram: 8/16/17:  Coronary Findings  LM                 normal  LAD               60-70% mid, mild competitive distal flow, D1 100% mid  Cx                  OM2 100% with L-L collaterals  RCA               Normal  LVG               55%  EDP               15mmHg  SVG-OM                   Occluded  SVG-D                                 Patent  WADE-LAD                 Patent but very small and difficult to engage, multiple nonselective pictures taken    Last Stress Test: Oct '15:  FINDINGS there is an infarct in the lateral wall, with associated superimposed reversible ischemia. Remaining perfusion is normal.    Left Ventricular Ejection Fraction (LVEF):  61 %  Left Ventricular Wall Motion And Contractility:  Normal.  End-Diastolic Volume: 89 ml  End-Systolic Volume: 34 ml    Last Echo: May '18:  Summary   -There is mild concentric left ventricular hypertrophy.   -Left ventricular function is normal with ejection fraction estimated at   55%. No regional wall motion abnormalities are noted.   -Normal diastolic function.   -Mitral annular calcification is present. Mild mitral regurgitation is   present.   -Mild tricuspid regurgitation. Estimated pulmonary artery systolic pressure   is at 28 mmHg assuming a right atrial pressure of 3 mmHg. Last Angiogram: 4/22/19:  Findings:                      LM       Normal              LAD     Mid 80% and hazy              Cx        OM              RCA     20% mid              LVG     Not performed              EDP     10 mmHg              L-LAD  Atretic, poor flow to distal LAD              S-D      Patent              S-OM   Occluded ostially  Post Cath Dx:Severe LAD disease  Intervention:  PCI of LAD with 2.75X23 Xience MAC    Assessment:      Diagnosis Orders   1.  Coronary artery disease involving native coronary artery of native heart without angina pectoris   ~improved : feeling better on Ranexa  ~doing some exercising in the form of water aeorbics ; ready to resume routine exercising   ~s/p PTCA LAD (atretic LIMA)  ~s/p CABG Jan '14, occl SVG > OM  ~DAPT (symptoms of tingling taking Plavix [c/o feet feeling cold]) / statin     2. Essential hypertension   ~controlled     3. Mixed hyperlipidemia   ~unchanged   ~trig elevated ; direct LDL near goal. Glu 121  ~remains on crestor     4. Chest discomfort   ~improved ; occ discomfort occurring ~ weekly   ~hx of back discomfort after exercise : ESR 18. d-Dimer < 200  ~intolerant to isosorbide           Patient  is stable since hospital discharge. Plan:  Continue present management    ~will be RTW on 6/17   F/U in 4 months    I have addressed the patient's cardiac risk factors and adjusted pharmacologic treatment as needed. In addition, I have reinforced the need for patient directed risk factor modification. Further evaluation will be based upon the patient's clinical course and testing results. All questions and concerns were addressed to the patient. Alternatives to  treatment were discussed. The patient  currently  is not smoking. The risks related to smoking were reviewed with the patient. Recommend maintaining a smoke-free lifestyle. Dual Antiplatelet therapy has been recommended / prescribed for this patient. Education conducted on adverse reactions including bleeding was discussed. The patient verbalizes understanding. Pt is on a BB  Pt is on an ace-i  Pt is on a statin      Saturated fat diet discussed  Exercise program discussed    Thank you for allowing to us to participate in the care of Teresita De Los Santos 80  Documentation of today's visit sent to PCP

## 2019-06-14 NOTE — LETTER
[address] [place] [address] Cardiology [place] [address] [address] [address]  Phone: [phone]  Fax: [phone]      June 14, 2019     [address] [place] [id number]    Patient: Josemanuel Meier  MR Number: [id number]  YOB: 1960  Date of Visit: 6/14/2019    Dear  [address]:    Magali Null     Outpatient Follow Up Note      Ray Taylor is a 59 yo female who presents today with a history of CAD s/p CABG Jan '14, occl SVG > OM by cath Aug '17; s/p PTCA LAD Apri '19 with SVG > OM occluded ostially and hyperlipidemia. CHIEF COMPLAINT / HPI:  Follow Up secondary to CAD taking Ranexa. Subjective:   she''ll have a little chest discomfort here/there lasting ~ 5 minutes. It may happen once a week. She feels good. She went to [place] for two weeks, did water aerobics and did well. She had no discomfort nor felt the pulsation in her back/chest. She's ready to resume exercising and RTW. There is no SOB/WASHBURN. The patient denies orthopnea/PND. The patient does not have swelling. The patients weight is stable . The patient is not experiencing palpitations or dizziness. These symptoms have improved since the last OV 2 weeks ago. With regard to medication therapy the patient has been compliant with prescribed regimen. They have tolerated therapy to date.      Current Outpatient Medications   Medication Sig Dispense Refill    lisinopril (PRINIVIL;ZESTRIL) 40 MG tablet TAKE 1 TABLET BY MOUTH ONE TIME A DAY  90 tablet 3    ticagrelor (BRILINTA) 90 MG TABS tablet Take 1 tablet by mouth 2 times daily 60 tablet 5    ranolazine (RANEXA) 500 MG extended release tablet Take 1 tablet by mouth 2 times daily 60 tablet 3    rosuvastatin (CRESTOR) 20 MG tablet Take 1 tablet by mouth every evening 90 tablet 3    hydrochlorothiazide (HYDRODIURIL) 25 MG tablet Take 1 tablet by mouth daily 90 tablet 3  SM ASPIRIN ADULT LOW STRENGTH 81 MG EC tablet TAKE 1 TABLET BY MOUTH ONE TIME A DAY  30 tablet 0    nitroGLYCERIN (NITROSTAT) 0.4 MG SL tablet Place 1 tablet under the tongue every 5 minutes as needed for Chest pain up to max of 3 total doses. If no relief after 1 dose, call 911. 25 tablet 3       Objective:   PHYSICAL EXAM:       Vitals:    06/14/19 0821 06/14/19 0832   BP: 118/74 112/64   Site: Left Upper Arm    Position: Sitting    Cuff Size: Large Adult    Pulse: 66    Resp: 16    SpO2: 99%    Weight: 175 lb (79.4 kg)    Height: 5' 6\" (1.676 m)         VITALS:  /74 (Site: Left Upper Arm, Position: Sitting, Cuff Size: Large Adult)   Pulse 66   Resp 16   Ht 5' 6\" (1.676 m)   Wt 175 lb (79.4 kg)   SpO2 99%   BMI 28.25 kg/m²      CONSTITUTIONAL: Cooperative, no apparent distress, and appears well nourished / developed  NEUROLOGIC:  Awake and orientated to person, place and time. PSYCH: Calm affect. SKIN: Warm and dry; tan  HEENT: Sclera non-icteric, normocephalic, neck supple, no elevation of JVP, normal carotid pulses with no bruits and thyroid normal size. LUNGS:  No increased work of breathing and clear to auscultation, no crackles or wheezing. CARDIOVASCULAR:  Regular rate 72 and rhythm with no murmurs, gallops, rubs, or abnormal heart sounds, normal PMI. The apical impulses not displaced. Heart tones are crisp and normal                                                                                            Cervical veins are not engorged                 JVP less than 8 cm H2O                                                                              The carotid upstroke is normal in amplitude and contour without delay or bruit    ABDOMEN:  Normal bowel sounds, non-distended and non-tender to palpation   EXT: No edema, no calf tenderness. Pulses are present bilaterally.     DATA:      Lab Results   Component Value Date    CHOL 190 05/08/2019 CHOL 142 08/16/2017    CHOL 208 (H) 01/16/2014     Lab Results   Component Value Date    TRIG 337 (H) 05/08/2019    TRIG 165 (H) 08/16/2017    TRIG 281 (H) 01/16/2014     Lab Results   Component Value Date    HDL 52 05/08/2019    HDL 53 08/16/2017    HDL 40 01/16/2014     Lab Results   Component Value Date    LDL-direct 103 05/08/2019         Radiology Review:  Pertinent images / reports were reviewed as a part of this visit and reveals the following:     Last Angiogram: 8/16/17:  Coronary Findings  LM                 normal  LAD               60-70% mid, mild competitive distal flow, D1 100% mid  Cx                  OM2 100% with L-L collaterals  RCA               Normal  LVG               55%  EDP               15mmHg  SVG-OM                   Occluded  SVG-D                                 Patent  WADE-LAD                 Patent but very small and difficult to engage, multiple nonselective pictures taken    Last Stress Test: Oct '15:  FINDINGS there is an infarct in the lateral wall, with associated superimposed reversible ischemia. Remaining perfusion is normal.    Left Ventricular Ejection Fraction (LVEF):  61 %  Left Ventricular Wall Motion And Contractility:  Normal.  End-Diastolic Volume: 89 ml  End-Systolic Volume: 34 ml    Last Echo: May '18:  Summary   -There is mild concentric left ventricular hypertrophy.   -Left ventricular function is normal with ejection fraction estimated at   55%. No regional wall motion abnormalities are noted.   -Normal diastolic function.   -Mitral annular calcification is present. Mild mitral regurgitation is   present.   -Mild tricuspid regurgitation. Estimated pulmonary artery systolic pressure   is at 28 mmHg assuming a right atrial pressure of 3 mmHg.     Last Angiogram: 4/22/19:  Findings:                      LM       Normal              LAD     Mid 80% and hazy              Cx        OM              RCA     20% mid              LVG     Not performed

## 2019-06-28 ENCOUNTER — TELEPHONE (OUTPATIENT)
Dept: CARDIOLOGY CLINIC | Age: 59
End: 2019-06-28

## 2019-06-28 RX ORDER — RANOLAZINE 1000 MG/1
500 TABLET, EXTENDED RELEASE ORAL 2 TIMES DAILY
COMMUNITY
End: 2019-06-28 | Stop reason: SDUPTHER

## 2019-06-28 RX ORDER — RANOLAZINE 1000 MG/1
1000 TABLET, EXTENDED RELEASE ORAL 2 TIMES DAILY
Qty: 180 TABLET | Refills: 1 | Status: SHIPPED | OUTPATIENT
Start: 2019-06-28 | End: 2019-09-14

## 2019-06-28 NOTE — TELEPHONE ENCOUNTER
Notified pt of NPT message below. She verbalized understanding. The pt is requesting that a new prescription of the Ranexa 1000 mg BID be sent to the pharmacy. E prescribed Ranexa 1000 mg BID #180 with 1 refill to [facility] in [place].   Updated pt's med list.

## 2019-06-28 NOTE — TELEPHONE ENCOUNTER
She got stent 10 wks ago and was released to go back to work by NPTS at last office visit 6/14/19. On 6/15/19 ALL her symptoms came back . She now feels just as bad as before her stent. She wonders if she should be put on a different medication or if NPTS thinks there might be something else wrong with her .  Please call to advise

## 2019-07-02 RX ORDER — HYDROCHLOROTHIAZIDE 25 MG/1
TABLET ORAL
Qty: 30 TABLET | Refills: 2 | OUTPATIENT
Start: 2019-07-02

## 2019-07-02 NOTE — TELEPHONE ENCOUNTER
Lov 6/14/2019  Labs 5/8/2019  Lrf 6/18/2019  Disregard this message medication was sent while in office visit.

## 2019-07-05 RX ORDER — HYDROCHLOROTHIAZIDE 25 MG/1
25 TABLET ORAL DAILY
Qty: 90 TABLET | Refills: 3 | Status: SHIPPED | OUTPATIENT
Start: 2019-07-05

## 2019-07-05 RX ORDER — HYDROCHLOROTHIAZIDE 25 MG/1
25 TABLET ORAL DAILY
Qty: 90 TABLET | Refills: 3 | Status: CANCELLED | OUTPATIENT
Start: 2019-07-05

## 2019-07-05 NOTE — TELEPHONE ENCOUNTER
Pt does NOT have any refills on the HCTZ 25 mg. She took her last pill last night. Please refill into [facility], [phone].

## 2019-09-14 RX ORDER — RANOLAZINE 500 MG/1
TABLET, EXTENDED RELEASE ORAL
Qty: 60 TABLET | Refills: 11 | Status: SHIPPED | OUTPATIENT
Start: 2019-09-14

## 2019-10-14 ENCOUNTER — OFFICE VISIT (OUTPATIENT)
Dept: CARDIOLOGY CLINIC | Age: 59
End: 2019-10-14
Payer: COMMERCIAL

## 2019-10-14 VITALS
BODY MASS INDEX: 28.28 KG/M2 | HEART RATE: 56 BPM | SYSTOLIC BLOOD PRESSURE: 126 MMHG | WEIGHT: 176 LBS | HEIGHT: 66 IN | DIASTOLIC BLOOD PRESSURE: 62 MMHG | OXYGEN SATURATION: 96 %

## 2019-10-14 DIAGNOSIS — I25.10 CORONARY ARTERY DISEASE INVOLVING NATIVE CORONARY ARTERY OF NATIVE HEART WITHOUT ANGINA PECTORIS: Primary | ICD-10-CM

## 2019-10-14 DIAGNOSIS — I10 ESSENTIAL HYPERTENSION: ICD-10-CM

## 2019-10-14 DIAGNOSIS — E78.2 MIXED HYPERLIPIDEMIA: ICD-10-CM

## 2019-10-14 PROCEDURE — G8484 FLU IMMUNIZE NO ADMIN: HCPCS | Performed by: NURSE PRACTITIONER

## 2019-10-14 PROCEDURE — 99214 OFFICE O/P EST MOD 30 MIN: CPT | Performed by: NURSE PRACTITIONER

## 2019-10-14 PROCEDURE — 1036F TOBACCO NON-USER: CPT | Performed by: NURSE PRACTITIONER

## 2019-10-14 PROCEDURE — G8598 ASA/ANTIPLAT THER USED: HCPCS | Performed by: NURSE PRACTITIONER

## 2019-10-14 PROCEDURE — G8417 CALC BMI ABV UP PARAM F/U: HCPCS | Performed by: NURSE PRACTITIONER

## 2019-10-14 PROCEDURE — 3017F COLORECTAL CA SCREEN DOC REV: CPT | Performed by: NURSE PRACTITIONER

## 2019-10-14 PROCEDURE — G8427 DOCREV CUR MEDS BY ELIG CLIN: HCPCS | Performed by: NURSE PRACTITIONER

## 2019-10-18 RX ORDER — ROSUVASTATIN CALCIUM 20 MG/1
TABLET, COATED ORAL
Qty: 30 TABLET | Refills: 2 | Status: SHIPPED | OUTPATIENT
Start: 2019-10-18 | End: 2020-01-16 | Stop reason: SDUPTHER

## 2019-10-29 RX ORDER — TICAGRELOR 90 MG/1
TABLET ORAL
Qty: 60 TABLET | Refills: 2 | Status: SHIPPED | OUTPATIENT
Start: 2019-10-29 | End: 2020-01-16 | Stop reason: SDUPTHER

## 2019-12-09 ENCOUNTER — OFFICE VISIT (OUTPATIENT)
Dept: CARDIOLOGY CLINIC | Age: 59
End: 2019-12-09
Payer: COMMERCIAL

## 2019-12-09 VITALS
HEART RATE: 74 BPM | DIASTOLIC BLOOD PRESSURE: 72 MMHG | HEIGHT: 66 IN | WEIGHT: 183.7 LBS | OXYGEN SATURATION: 95 % | SYSTOLIC BLOOD PRESSURE: 136 MMHG | BODY MASS INDEX: 29.52 KG/M2

## 2019-12-09 DIAGNOSIS — I10 ESSENTIAL HYPERTENSION: ICD-10-CM

## 2019-12-09 DIAGNOSIS — E78.2 MIXED HYPERLIPIDEMIA: ICD-10-CM

## 2019-12-09 DIAGNOSIS — I25.10 CORONARY ARTERY DISEASE INVOLVING NATIVE CORONARY ARTERY OF NATIVE HEART WITHOUT ANGINA PECTORIS: Primary | ICD-10-CM

## 2019-12-09 PROCEDURE — G8417 CALC BMI ABV UP PARAM F/U: HCPCS | Performed by: NURSE PRACTITIONER

## 2019-12-09 PROCEDURE — G8598 ASA/ANTIPLAT THER USED: HCPCS | Performed by: NURSE PRACTITIONER

## 2019-12-09 PROCEDURE — 3017F COLORECTAL CA SCREEN DOC REV: CPT | Performed by: NURSE PRACTITIONER

## 2019-12-09 PROCEDURE — G8484 FLU IMMUNIZE NO ADMIN: HCPCS | Performed by: NURSE PRACTITIONER

## 2019-12-09 PROCEDURE — G8427 DOCREV CUR MEDS BY ELIG CLIN: HCPCS | Performed by: NURSE PRACTITIONER

## 2019-12-09 PROCEDURE — 1036F TOBACCO NON-USER: CPT | Performed by: NURSE PRACTITIONER

## 2019-12-09 PROCEDURE — 99214 OFFICE O/P EST MOD 30 MIN: CPT | Performed by: NURSE PRACTITIONER

## 2019-12-09 RX ORDER — ASPIRIN 81 MG/1
81 TABLET ORAL
Qty: 30 TABLET | Refills: 0 | COMMUNITY
Start: 2019-12-09

## 2019-12-09 RX ORDER — RANOLAZINE 500 MG/1
500 TABLET, EXTENDED RELEASE ORAL 2 TIMES DAILY
COMMUNITY
End: 2019-12-09

## 2020-01-17 RX ORDER — ROSUVASTATIN CALCIUM 20 MG/1
20 TABLET, COATED ORAL DAILY
Qty: 30 TABLET | Refills: 3 | Status: SHIPPED | OUTPATIENT
Start: 2020-01-17

## 2020-02-13 ENCOUNTER — TELEPHONE (OUTPATIENT)
Dept: CARDIOLOGY CLINIC | Age: 60
End: 2020-02-13

## 2020-02-13 NOTE — TELEPHONE ENCOUNTER
Spoke with patient and she said she did not have any trouble picking her Brilinta up at the pharmacy. I told her to call the office back if it stops being covered by her insurance.

## 2020-06-30 RX ORDER — HYDROCHLOROTHIAZIDE 25 MG/1
TABLET ORAL
Qty: 150 TABLET | OUTPATIENT
Start: 2020-06-30

## 2022-08-01 ENCOUNTER — TELEPHONE (OUTPATIENT)
Dept: CARDIOLOGY CLINIC | Age: 62
End: 2022-08-01